# Patient Record
Sex: FEMALE | Race: WHITE | Employment: OTHER | URBAN - METROPOLITAN AREA
[De-identification: names, ages, dates, MRNs, and addresses within clinical notes are randomized per-mention and may not be internally consistent; named-entity substitution may affect disease eponyms.]

---

## 2017-07-12 PROBLEM — R42 VERTIGO: Status: ACTIVE | Noted: 2017-07-12

## 2017-07-12 PROBLEM — R53.82 CHRONIC FATIGUE: Status: ACTIVE | Noted: 2017-07-12

## 2017-12-06 PROBLEM — I49.5 SSS (SICK SINUS SYNDROME) (HCC): Status: ACTIVE | Noted: 2017-12-06

## 2017-12-19 PROBLEM — K58.2 IRRITABLE BOWEL SYNDROME WITH BOTH CONSTIPATION AND DIARRHEA: Status: ACTIVE | Noted: 2017-12-19

## 2018-01-12 PROBLEM — F41.9 ANXIETY: Status: ACTIVE | Noted: 2018-01-12

## 2019-01-01 ENCOUNTER — PATIENT OUTREACH (OUTPATIENT)
Dept: CASE MANAGEMENT | Age: 84
End: 2019-01-01

## 2019-01-01 ENCOUNTER — APPOINTMENT (OUTPATIENT)
Dept: GENERAL RADIOLOGY | Age: 84
End: 2019-01-01
Attending: EMERGENCY MEDICINE
Payer: MEDICARE

## 2019-01-01 ENCOUNTER — HOSPITAL ENCOUNTER (EMERGENCY)
Age: 84
Discharge: HOME | End: 2019-09-05
Attending: EMERGENCY MEDICINE
Payer: MEDICARE

## 2019-01-01 ENCOUNTER — APPOINTMENT (OUTPATIENT)
Dept: CT IMAGING | Age: 84
End: 2019-01-01
Attending: EMERGENCY MEDICINE
Payer: MEDICARE

## 2019-01-01 VITALS
HEART RATE: 60 BPM | HEIGHT: 65 IN | SYSTOLIC BLOOD PRESSURE: 144 MMHG | RESPIRATION RATE: 20 BRPM | TEMPERATURE: 98.7 F | WEIGHT: 172 LBS | DIASTOLIC BLOOD PRESSURE: 63 MMHG | BODY MASS INDEX: 28.66 KG/M2 | OXYGEN SATURATION: 91 %

## 2019-01-01 DIAGNOSIS — K29.90 GASTRITIS AND DUODENITIS: Primary | ICD-10-CM

## 2019-01-01 LAB
ALBUMIN SERPL-MCNC: 3.6 G/DL (ref 3.2–4.6)
ALBUMIN/GLOB SERPL: 0.8 {RATIO} (ref 1.2–3.5)
ALP SERPL-CCNC: 132 U/L (ref 50–136)
ALT SERPL-CCNC: 14 U/L (ref 12–65)
ANION GAP SERPL CALC-SCNC: 5 MMOL/L (ref 7–16)
APPEARANCE UR: CLEAR
AST SERPL-CCNC: 20 U/L (ref 15–37)
ATRIAL RATE: 133 BPM
BASOPHILS # BLD: 0.1 K/UL (ref 0–0.2)
BASOPHILS NFR BLD: 2 % (ref 0–2)
BILIRUB SERPL-MCNC: 0.6 MG/DL (ref 0.2–1.1)
BILIRUB UR QL: NEGATIVE
BNP SERPL-MCNC: 170 PG/ML
BUN SERPL-MCNC: 13 MG/DL (ref 8–23)
CALCIUM SERPL-MCNC: 9.3 MG/DL (ref 8.3–10.4)
CALCULATED R AXIS, ECG10: -88 DEGREES
CALCULATED T AXIS, ECG11: 96 DEGREES
CHLORIDE SERPL-SCNC: 106 MMOL/L (ref 98–107)
CO2 SERPL-SCNC: 29 MMOL/L (ref 21–32)
COLOR UR: YELLOW
CREAT SERPL-MCNC: 1.36 MG/DL (ref 0.6–1)
DIAGNOSIS, 93000: NORMAL
DIFFERENTIAL METHOD BLD: ABNORMAL
EOSINOPHIL # BLD: 0.3 K/UL (ref 0–0.8)
EOSINOPHIL NFR BLD: 6 % (ref 0.5–7.8)
ERYTHROCYTE [DISTWIDTH] IN BLOOD BY AUTOMATED COUNT: 15.8 % (ref 11.9–14.6)
GLOBULIN SER CALC-MCNC: 4.7 G/DL (ref 2.3–3.5)
GLUCOSE SERPL-MCNC: 93 MG/DL (ref 65–100)
GLUCOSE UR STRIP.AUTO-MCNC: NEGATIVE MG/DL
HCT VFR BLD AUTO: 33.9 % (ref 35.8–46.3)
HGB BLD-MCNC: 10.5 G/DL (ref 11.7–15.4)
HGB UR QL STRIP: NEGATIVE
IMM GRANULOCYTES # BLD AUTO: 0 K/UL (ref 0–0.5)
IMM GRANULOCYTES NFR BLD AUTO: 0 % (ref 0–5)
KETONES UR QL STRIP.AUTO: NEGATIVE MG/DL
LACTATE BLD-SCNC: 0.82 MMOL/L (ref 0.5–1.9)
LEUKOCYTE ESTERASE UR QL STRIP.AUTO: NEGATIVE
LIPASE SERPL-CCNC: 88 U/L (ref 73–393)
LYMPHOCYTES # BLD: 0.8 K/UL (ref 0.5–4.6)
LYMPHOCYTES NFR BLD: 16 % (ref 13–44)
MCH RBC QN AUTO: 30.8 PG (ref 26.1–32.9)
MCHC RBC AUTO-ENTMCNC: 31 G/DL (ref 31.4–35)
MCV RBC AUTO: 99.4 FL (ref 79.6–97.8)
MONOCYTES # BLD: 0.5 K/UL (ref 0.1–1.3)
MONOCYTES NFR BLD: 10 % (ref 4–12)
NEUTS SEG # BLD: 3.4 K/UL (ref 1.7–8.2)
NEUTS SEG NFR BLD: 65 % (ref 43–78)
NITRITE UR QL STRIP.AUTO: NEGATIVE
NRBC # BLD: 0 K/UL (ref 0–0.2)
PH UR STRIP: 8 [PH] (ref 5–9)
PLATELET # BLD AUTO: 220 K/UL (ref 150–450)
PMV BLD AUTO: 10.6 FL (ref 9.4–12.3)
POTASSIUM SERPL-SCNC: 4.3 MMOL/L (ref 3.5–5.1)
PROT SERPL-MCNC: 8.3 G/DL (ref 6.3–8.2)
PROT UR STRIP-MCNC: NEGATIVE MG/DL
Q-T INTERVAL, ECG07: 464 MS
QRS DURATION, ECG06: 172 MS
QTC CALCULATION (BEZET), ECG08: 464 MS
RBC # BLD AUTO: 3.41 M/UL (ref 4.05–5.2)
SODIUM SERPL-SCNC: 140 MMOL/L (ref 136–145)
SP GR UR REFRACTOMETRY: 1.01 (ref 1–1.02)
UROBILINOGEN UR QL STRIP.AUTO: 0.2 EU/DL (ref 0.2–1)
VENTRICULAR RATE, ECG03: 60 BPM
WBC # BLD AUTO: 5.1 K/UL (ref 4.3–11.1)

## 2019-01-01 PROCEDURE — 81003 URINALYSIS AUTO W/O SCOPE: CPT

## 2019-01-01 PROCEDURE — 96361 HYDRATE IV INFUSION ADD-ON: CPT | Performed by: EMERGENCY MEDICINE

## 2019-01-01 PROCEDURE — 85025 COMPLETE CBC W/AUTO DIFF WBC: CPT

## 2019-01-01 PROCEDURE — 80053 COMPREHEN METABOLIC PANEL: CPT

## 2019-01-01 PROCEDURE — 99285 EMERGENCY DEPT VISIT HI MDM: CPT | Performed by: EMERGENCY MEDICINE

## 2019-01-01 PROCEDURE — 74011250636 HC RX REV CODE- 250/636: Performed by: EMERGENCY MEDICINE

## 2019-01-01 PROCEDURE — 70450 CT HEAD/BRAIN W/O DYE: CPT

## 2019-01-01 PROCEDURE — 96360 HYDRATION IV INFUSION INIT: CPT | Performed by: EMERGENCY MEDICINE

## 2019-01-01 PROCEDURE — 71045 X-RAY EXAM CHEST 1 VIEW: CPT

## 2019-01-01 PROCEDURE — 83690 ASSAY OF LIPASE: CPT

## 2019-01-01 PROCEDURE — 83605 ASSAY OF LACTIC ACID: CPT

## 2019-01-01 PROCEDURE — 83880 ASSAY OF NATRIURETIC PEPTIDE: CPT

## 2019-01-01 PROCEDURE — 93005 ELECTROCARDIOGRAM TRACING: CPT | Performed by: EMERGENCY MEDICINE

## 2019-01-01 RX ORDER — FAMOTIDINE 40 MG/1
40 TABLET, FILM COATED ORAL
Qty: 20 TAB | Refills: 0 | Status: SHIPPED | OUTPATIENT
Start: 2019-01-01 | End: 2019-01-01 | Stop reason: ALTCHOICE

## 2019-01-01 RX ADMIN — SODIUM CHLORIDE 1000 ML: 900 INJECTION, SOLUTION INTRAVENOUS at 14:03

## 2019-03-07 PROBLEM — I25.10 CAD (CORONARY ARTERY DISEASE): Status: ACTIVE | Noted: 2019-03-07

## 2019-06-13 ENCOUNTER — ANESTHESIA EVENT (OUTPATIENT)
Dept: ENDOSCOPY | Age: 84
End: 2019-06-13
Payer: MEDICARE

## 2019-06-13 RX ORDER — SODIUM CHLORIDE 0.9 % (FLUSH) 0.9 %
5-40 SYRINGE (ML) INJECTION EVERY 8 HOURS
Status: CANCELLED | OUTPATIENT
Start: 2019-06-13

## 2019-06-13 RX ORDER — SODIUM CHLORIDE 0.9 % (FLUSH) 0.9 %
5-40 SYRINGE (ML) INJECTION AS NEEDED
Status: CANCELLED | OUTPATIENT
Start: 2019-06-13

## 2019-06-13 RX ORDER — SODIUM CHLORIDE, SODIUM LACTATE, POTASSIUM CHLORIDE, CALCIUM CHLORIDE 600; 310; 30; 20 MG/100ML; MG/100ML; MG/100ML; MG/100ML
100 INJECTION, SOLUTION INTRAVENOUS CONTINUOUS
Status: CANCELLED | OUTPATIENT
Start: 2019-06-13

## 2019-06-14 ENCOUNTER — HOSPITAL ENCOUNTER (OUTPATIENT)
Age: 84
Setting detail: OUTPATIENT SURGERY
Discharge: HOME | End: 2019-06-14
Attending: INTERNAL MEDICINE | Admitting: INTERNAL MEDICINE
Payer: MEDICARE

## 2019-06-14 ENCOUNTER — ANESTHESIA (OUTPATIENT)
Dept: ENDOSCOPY | Age: 84
End: 2019-06-14
Payer: MEDICARE

## 2019-06-14 VITALS
RESPIRATION RATE: 18 BRPM | TEMPERATURE: 97.7 F | OXYGEN SATURATION: 98 % | SYSTOLIC BLOOD PRESSURE: 143 MMHG | HEIGHT: 62 IN | BODY MASS INDEX: 32.39 KG/M2 | WEIGHT: 176 LBS | HEART RATE: 68 BPM | DIASTOLIC BLOOD PRESSURE: 66 MMHG

## 2019-06-14 PROCEDURE — 76040000025: Performed by: INTERNAL MEDICINE

## 2019-06-14 PROCEDURE — 74011250636 HC RX REV CODE- 250/636

## 2019-06-14 PROCEDURE — 76060000031 HC ANESTHESIA FIRST 0.5 HR: Performed by: INTERNAL MEDICINE

## 2019-06-14 PROCEDURE — 77030021593 HC FCPS BIOP ENDOSC BSC -A: Performed by: INTERNAL MEDICINE

## 2019-06-14 PROCEDURE — 74011250636 HC RX REV CODE- 250/636: Performed by: ANESTHESIOLOGY

## 2019-06-14 RX ORDER — SODIUM CHLORIDE, SODIUM LACTATE, POTASSIUM CHLORIDE, CALCIUM CHLORIDE 600; 310; 30; 20 MG/100ML; MG/100ML; MG/100ML; MG/100ML
100 INJECTION, SOLUTION INTRAVENOUS CONTINUOUS
Status: DISCONTINUED | OUTPATIENT
Start: 2019-06-14 | End: 2019-06-14 | Stop reason: HOSPADM

## 2019-06-14 RX ORDER — SODIUM CHLORIDE 9 MG/ML
10 INJECTION, SOLUTION INTRAVENOUS CONTINUOUS
Status: DISCONTINUED | OUTPATIENT
Start: 2019-06-14 | End: 2019-06-14 | Stop reason: HOSPADM

## 2019-06-14 RX ORDER — PROPOFOL 10 MG/ML
INJECTION, EMULSION INTRAVENOUS AS NEEDED
Status: DISCONTINUED | OUTPATIENT
Start: 2019-06-14 | End: 2019-06-14 | Stop reason: HOSPADM

## 2019-06-14 RX ORDER — LIDOCAINE HYDROCHLORIDE 20 MG/ML
INJECTION, SOLUTION EPIDURAL; INFILTRATION; INTRACAUDAL; PERINEURAL AS NEEDED
Status: DISCONTINUED | OUTPATIENT
Start: 2019-06-14 | End: 2019-06-14 | Stop reason: HOSPADM

## 2019-06-14 RX ADMIN — PROPOFOL 30 MG: 10 INJECTION, EMULSION INTRAVENOUS at 11:26

## 2019-06-14 RX ADMIN — FAMOTIDINE 20 MG: 10 INJECTION, SOLUTION INTRAVENOUS at 11:17

## 2019-06-14 RX ADMIN — PROPOFOL 40 MG: 10 INJECTION, EMULSION INTRAVENOUS at 11:24

## 2019-06-14 RX ADMIN — LIDOCAINE HYDROCHLORIDE 50 MG: 20 INJECTION, SOLUTION EPIDURAL; INFILTRATION; INTRACAUDAL; PERINEURAL at 11:24

## 2019-06-14 RX ADMIN — PROPOFOL 20 MG: 10 INJECTION, EMULSION INTRAVENOUS at 11:28

## 2019-06-14 RX ADMIN — SODIUM CHLORIDE, SODIUM LACTATE, POTASSIUM CHLORIDE, AND CALCIUM CHLORIDE 100 ML/HR: 600; 310; 30; 20 INJECTION, SOLUTION INTRAVENOUS at 11:17

## 2019-06-14 RX ADMIN — PROPOFOL 10 MG: 10 INJECTION, EMULSION INTRAVENOUS at 11:30

## 2019-06-14 NOTE — DISCHARGE INSTRUCTIONS
Gastrointestinal Esophagogastroduodenoscopy (EGD) - Upper Exam Discharge Instructions    1. Call Dr. Shlomo Najera at [phone] for any problems or questions. 2. Contact the doctor's office for follow up appointment as directed. 3. Medication may cause drowsiness for several hours, therefore, do not drive or operate machinery for remainder of the day. Do not make any legal decisions today. 4. No alcohol today. 5. Ordinarily, you may resume regular diet and activity after exam unless otherwise specified by your physician. 6. For mild soreness in your throat you may use Cepacol throat lozenges or warm salt-water gargles as needed. Any additional instructions:   1. Follow-up in office  2.  Repeat dilation as needed

## 2019-06-14 NOTE — ANESTHESIA PREPROCEDURE EVALUATION
Relevant Problems No relevant active problems Anesthetic History No history of anesthetic complications Review of Systems / Medical History Patient summary reviewed and pertinent labs reviewed Pulmonary Within defined limits Neuro/Psych Within defined limits Cardiovascular Hypertension: well controlled Valvular problems/murmurs (s/p TVR at 2008, mod MR/AI; nl ER) Dysrhythmias : atrial fibrillation CAD and hyperlipidemia Pertinent negatives: Pacemaker: st vinicio pacer. Exercise tolerance: <4 METS Comments: On xarelto GI/Hepatic/Renal 
  
GERD: well controlled Endo/Other Arthritis Other Findings Comments: Dysphagia PMR 
LBP Neuropathy Gout 
presbycusis Physical Exam 
 
Airway Mallampati: II 
TM Distance: 4 - 6 cm Neck ROM: normal range of motion Mouth opening: Normal 
 
 Cardiovascular Rhythm: regular Rate: normal 
 
 
 
 Dental 
 
Dentition: Full lower dentures and Full upper dentures Pulmonary Breath sounds clear to auscultation Abdominal 
GI exam deferred Other Findings Anesthetic Plan ASA: 3 Anesthesia type: total IV anesthesia Induction: Intravenous Anesthetic plan and risks discussed with: Patient

## 2019-06-14 NOTE — H&P
History and Physical for Outpatient Procedure Date: 6/14/2019 History of Present Illness:  Patient has history of dysphagia and presents for EGD with possible esophageal dilation. Past Medical History:  
Diagnosis Date  Abnormal coagulation profile  Anxiety 1/12/2018  Arthritis  Atrial fibrillation ([facility] [address] 11/15/2012  
 and SSS and has St vinicio pacer  Benign neoplasm of colon  CAD (coronary artery disease)   
 valve replacement in 2006 (patient unsure which valve and not noted in Dr Alan Florez note  Depressive disorder, not elsewhere classified  Edema  Encounter for long-term (current) use of other medications  Esophageal reflux  Essential hypertension, benign  GERD (gastroesophageal reflux disease)   
 controlled with omperazole  Gout, unspecified   
 patient and family denies  Hearing loss  Hypertension  Insomnia, unspecified  Loss of height  Lumbago  Nervousness(799.21)  Osteoarthrosis involving, or with mention of more than one site, but not specified as generalized, multiple sites  Osteopenia  Other and unspecified hyperlipidemia  Other dyspnea and respiratory abnormality  Other malaise and fatigue  Pain in limb  Polymyalgia rheumatica ([facility] [address]  Premature menopause 4/10/2015  Renal insufficiency   
 patient and family denies, last creatine was 0.9 12/2015  Unspecified deficiency anemia  Unspecified fall  Unspecified hereditary and idiopathic peripheral neuropathy  Urinary frequency  Urinary tract infection, site not specified Past Surgical History:  
Procedure Laterality Date  HX APPENDECTOMY  age 25  
 HX CHOLECYSTECTOMY  2002 [address] VALVE SURGERY  2006 Replacement, family and patient not sure which valve, note noted in cardiology note  HX HYSTERECTOMY  HX KNEE REPLACEMENT Left 12/15/2011  HX KNEE REPLACEMENT Right 10/05/2005 Dr Dangelo Agosto  HX LUMBAR LAMINECTOMY  HX LUMBAR LAMINECTOMY  HX PACEMAKER  11/15/12 Dual chamber, non rate responsive (implantable) st vinicio In and Family History Problem Relation Age of Onset  Pacemaker Mother  Heart Disease Mother  Cancer Father  Pacemaker Brother  Heart Disease Brother  Diabetes Sister  Diabetes Sister Social History Tobacco Use  Smoking status: Never Smoker  Smokeless tobacco: Never Used Substance Use Topics  Alcohol use: No  
  Alcohol/week: 0.0 oz Allergies Allergen Reactions  Prednisolone Not Reported This Time S/s: heart racing per family member  Prednisone Unknown (comments)  Protuss [Hydrocodone-Potassium Guaiaco] Other (comments) Sleepless, jittery No current facility-administered medications for this encounter. Current Outpatient Medications Medication Sig  
 amLODIPine (NORVASC) 2.5 mg tablet Take 1 Tab by mouth daily.  [START ON 8/5/2019] HYDROcodone-acetaminophen (NORCO) 5-325 mg per tablet Take 1 Tab by mouth every eight (8) hours as needed for Pain for up to 30 days. Max Daily Amount: 3 Tabs.  furosemide (LASIX) 20 mg tablet Take 1 tablet by mouth daily.  citalopram (CELEXA) 40 mg tablet Take 1 tablet by mouth daily.  nystatin (MYCOSTATIN) topical cream Apply 1 g to affected area two (2) times a day.  pantoprazole (PROTONIX) 40 mg tablet Take 1 Tab by mouth daily.  buPROPion XL (WELLBUTRIN XL) 150 mg tablet Take 1 Tab by mouth every morning.  spironolactone (ALDACTONE) 25 mg tablet Take 1 Tab by mouth daily.  LORazepam (ATIVAN) 1 mg tablet TAKE ONE TABLET BY MOUTH TWICE DAILY AS NEEDED FOR ANXIETY  diclofenac (VOLTAREN) 1 % gel Apply  to affected area four (4) times daily.  olmesartan (BENICAR) 20 mg tablet Take 1 Tab by mouth daily.  rivaroxaban (XARELTO) 20 mg tab tablet Take 1 Tab by mouth daily.  TOCILIZUMAB (ACTEMRA IV) by IntraVENous route every thirty (30) days. Patient has had to put on hold per Rheumatologist until after follow up appt with Dr Danny Poon  nitroglycerin (NITROSTAT) 0.4 mg SL tablet by SubLINGual route every five (5) minutes as needed for Chest Pain.  FOLIC ACID/MV,FE,OTHER MIN (CENTRUM PO) Take  by mouth daily. Review of Systems: A detailed 10 organ review of systems is obtained with pertinent positives as listed in the History of Present Illness. All others are negative. Objective:  
 
Physical Exam: 
There were no vitals taken for this visit. General:  Alert and oriented. Heart: Regular rate and rhythm Lungs:  Clear to auscultation bilaterally Abdomen: Soft, nontender, nondistended Impression/Plan:  
 
Proceed with EGD with possible dilation as planned. I have discussed with the patient the technique, benefits, alternatives, and risks of these procedures, including medication reaction, immediate or delayed bleeding, or perforation of the gastrointestinal tract. Signed By: Yeny Sexton MD   
 June 14, 2019

## 2019-06-14 NOTE — ADDENDUM NOTE
Addendum  created 06/14/19 1150 by Wendi Yadav MD  
 Attestation recorded in [address] [place] [address], [facility] 97 filed

## 2019-06-14 NOTE — PROCEDURES
Esophagogastroduodenoscopy DATE of PROCEDURE: 6/14/2019 MEDICATIONS ADMINISTERED: MAC INSTRUMENT: GIF 
 
PROCEDURE:  After obtaining informed consent, the patient was placed in the left lateral position and sedated. The endoscope was advanced under direct vision without difficulty. The esophagus, stomach (including retroflexed views) and duodenum were evaluated. The patient was taken to the recovery area in stable condition. FINDINGS: 
 
ESOPHAGUS: Schatzki ring at the GEJ. Some mild tapering prior to this. Dilation performed with [address] dilator. The ring was also fractured with biopsy forceps. Post dilation inspection revealed no deep mucosal injury. STOMACH: Unremarkable on forward and retroflexed views. DUODENUM: Unremarkable Estimated blood loss: 0-minimal  
 
PLAN: 
1. Follow up in office with Dr Randy George 2. Can repeat dilation if needed.  
 
Mariana Kelsey MD 
[facility], [place]

## 2019-06-14 NOTE — ROUTINE PROCESS
VSS. Discharge instructions reviewed with patient and daughter and copy of instructions sent home with patient. Dr. Hannah Castle spoke with patient and daughter prior to discharge. Questions answered. Discharged via personal vehicle, wheeled out by [place]. IV discontinued prior to discharge. Personal items with patient at discharge: all clothing shoes belongings

## 2019-06-14 NOTE — ANESTHESIA POSTPROCEDURE EVALUATION
Procedure(s): ESOPHAGOGASTRODUODENOSCOPY (EGD) WITH DILATION /BMI 31 ESOPHAGEAL DILATION. total IV anesthesia Anesthesia Post Evaluation Multimodal analgesia: multimodal analgesia used between 6 hours prior to anesthesia start to PACU discharge Patient location during evaluation: bedside Patient participation: complete - patient participated Level of consciousness: awake Pain management: adequate Airway patency: patent Anesthetic complications: no 
Cardiovascular status: acceptable and stable Respiratory status: acceptable and room air Hydration status: acceptable Post anesthesia nausea and vomiting:  none Vitals Value Taken Time /59 6/14/2019 11:47 AM  
Temp 36.5 °C (97.7 °F) 6/14/2019 11:38 AM  
Pulse 60 6/14/2019 11:48 AM  
Resp 16 6/14/2019 11:38 AM  
SpO2 97 % 6/14/2019 11:48 AM  
Vitals shown include unvalidated device data.

## 2019-06-24 ENCOUNTER — HOME HEALTH ADMISSION (OUTPATIENT)
Dept: HOME HEALTH SERVICES | Facility: HOME HEALTH | Age: 84
End: 2019-06-24

## 2019-06-25 ENCOUNTER — HOSPITAL ENCOUNTER (INPATIENT)
Age: 84
LOS: 2 days | Discharge: HOME | DRG: 683 | End: 2019-06-27
Attending: EMERGENCY MEDICINE | Admitting: FAMILY MEDICINE
Payer: MEDICARE

## 2019-06-25 DIAGNOSIS — E87.5 ACUTE HYPERKALEMIA: Primary | ICD-10-CM

## 2019-06-25 DIAGNOSIS — N18.9 ACUTE ON CHRONIC RENAL INSUFFICIENCY: ICD-10-CM

## 2019-06-25 DIAGNOSIS — I50.9 CHRONIC CONGESTIVE HEART FAILURE, UNSPECIFIED HEART FAILURE TYPE (HCC): ICD-10-CM

## 2019-06-25 DIAGNOSIS — N28.9 ACUTE ON CHRONIC RENAL INSUFFICIENCY: ICD-10-CM

## 2019-06-25 PROBLEM — N17.9 ACUTE KIDNEY INJURY SUPERIMPOSED ON CKD (HCC): Status: ACTIVE | Noted: 2019-06-25

## 2019-06-25 PROBLEM — G89.29 CHRONIC PAIN: Status: ACTIVE | Noted: 2019-06-25

## 2019-06-25 LAB
ALBUMIN SERPL-MCNC: 3.8 G/DL (ref 3.2–4.6)
ALBUMIN/GLOB SERPL: 1 {RATIO} (ref 1.2–3.5)
ALP SERPL-CCNC: 93 U/L (ref 50–136)
ALT SERPL-CCNC: 19 U/L (ref 12–65)
ANION GAP SERPL CALC-SCNC: 6 MMOL/L (ref 7–16)
ARTERIAL PATENCY WRIST A: ABNORMAL
AST SERPL-CCNC: 19 U/L (ref 15–37)
ATRIAL RATE: 60 BPM
BASOPHILS # BLD: 0.1 K/UL (ref 0–0.2)
BASOPHILS NFR BLD: 1 % (ref 0–2)
BDY SITE: ABNORMAL
BILIRUB SERPL-MCNC: 0.7 MG/DL (ref 0.2–1.1)
BNP SERPL-MCNC: 119 PG/ML
BODY TEMPERATURE: 98.6
BUN SERPL-MCNC: 55 MG/DL (ref 8–23)
CA-I BLD-MCNC: 1.5 MMOL/L (ref 1.12–1.32)
CALCIUM SERPL-MCNC: 9.6 MG/DL (ref 8.3–10.4)
CALCULATED R AXIS, ECG10: -98 DEGREES
CALCULATED T AXIS, ECG11: 120 DEGREES
CHLORIDE SERPL-SCNC: 109 MMOL/L (ref 98–107)
CO2 SERPL-SCNC: 23 MMOL/L (ref 21–32)
COLLECT TIME,HTIME: 1425
CREAT SERPL-MCNC: 2.68 MG/DL (ref 0.6–1)
DIAGNOSIS, 93000: NORMAL
DIFFERENTIAL METHOD BLD: ABNORMAL
EOSINOPHIL # BLD: 0.2 K/UL (ref 0–0.8)
EOSINOPHIL NFR BLD: 4 % (ref 0.5–7.8)
ERYTHROCYTE [DISTWIDTH] IN BLOOD BY AUTOMATED COUNT: 12.1 % (ref 11.9–14.6)
GAS FLOW.O2 O2 DELIVERY SYS: ABNORMAL L/MIN
GLOBULIN SER CALC-MCNC: 3.9 G/DL (ref 2.3–3.5)
GLUCOSE BLD STRIP.AUTO-MCNC: 87 MG/DL (ref 65–100)
GLUCOSE SERPL-MCNC: 103 MG/DL (ref 65–100)
HCO3 BLDV-SCNC: 23.5 MMOL/L (ref 23–28)
HCT VFR BLD AUTO: 33.7 % (ref 35.8–46.3)
HGB BLD-MCNC: 10.8 G/DL (ref 11.7–15.4)
IMM GRANULOCYTES # BLD AUTO: 0 K/UL (ref 0–0.5)
IMM GRANULOCYTES NFR BLD AUTO: 1 % (ref 0–5)
LYMPHOCYTES # BLD: 1.1 K/UL (ref 0.5–4.6)
LYMPHOCYTES NFR BLD: 25 % (ref 13–44)
MCH RBC QN AUTO: 30.5 PG (ref 26.1–32.9)
MCHC RBC AUTO-ENTMCNC: 32 G/DL (ref 31.4–35)
MCV RBC AUTO: 95.2 FL (ref 79.6–97.8)
MONOCYTES # BLD: 0.4 K/UL (ref 0.1–1.3)
MONOCYTES NFR BLD: 8 % (ref 4–12)
NEUTS SEG # BLD: 2.8 K/UL (ref 1.7–8.2)
NEUTS SEG NFR BLD: 61 % (ref 43–78)
NRBC # BLD: 0 K/UL (ref 0–0.2)
PCO2 BLDV: 44.5 MMHG (ref 41–51)
PH BLDV: 7.33 [PH] (ref 7.32–7.42)
PLATELET # BLD AUTO: 161 K/UL (ref 150–450)
PMV BLD AUTO: 10.9 FL (ref 9.4–12.3)
PO2 BLDV: 17 MMHG
POTASSIUM BLD-SCNC: 6.2 MMOL/L (ref 3.5–5.5)
POTASSIUM SERPL-SCNC: 6 MMOL/L (ref 3.5–5.1)
PROT SERPL-MCNC: 7.7 G/DL (ref 6.3–8.2)
Q-T INTERVAL, ECG07: 430 MS
QRS DURATION, ECG06: 162 MS
QTC CALCULATION (BEZET), ECG08: 440 MS
RBC # BLD AUTO: 3.54 M/UL (ref 4.05–5.2)
SAO2 % BLDV: 21 % (ref 65–88)
SERVICE CMNT-IMP: ABNORMAL
SERVICE CMNT-IMP: ABNORMAL
SODIUM BLD-SCNC: 139 MMOL/L (ref 135–145)
SODIUM SERPL-SCNC: 138 MMOL/L (ref 136–145)
SPECIMEN TYPE: ABNORMAL
VENTRICULAR RATE, ECG03: 63 BPM
WBC # BLD AUTO: 4.6 K/UL (ref 4.3–11.1)

## 2019-06-25 PROCEDURE — 96375 TX/PRO/DX INJ NEW DRUG ADDON: CPT | Performed by: EMERGENCY MEDICINE

## 2019-06-25 PROCEDURE — 99285 EMERGENCY DEPT VISIT HI MDM: CPT | Performed by: EMERGENCY MEDICINE

## 2019-06-25 PROCEDURE — 83880 ASSAY OF NATRIURETIC PEPTIDE: CPT

## 2019-06-25 PROCEDURE — 74011000250 HC RX REV CODE- 250: Performed by: EMERGENCY MEDICINE

## 2019-06-25 PROCEDURE — 74011250636 HC RX REV CODE- 250/636: Performed by: EMERGENCY MEDICINE

## 2019-06-25 PROCEDURE — 74011250637 HC RX REV CODE- 250/637: Performed by: FAMILY MEDICINE

## 2019-06-25 PROCEDURE — 65660000000 HC RM CCU STEPDOWN

## 2019-06-25 PROCEDURE — 74011000258 HC RX REV CODE- 258: Performed by: EMERGENCY MEDICINE

## 2019-06-25 PROCEDURE — 85025 COMPLETE CBC W/AUTO DIFF WBC: CPT

## 2019-06-25 PROCEDURE — 74011250637 HC RX REV CODE- 250/637: Performed by: INTERNAL MEDICINE

## 2019-06-25 PROCEDURE — 74011250637 HC RX REV CODE- 250/637: Performed by: EMERGENCY MEDICINE

## 2019-06-25 PROCEDURE — 93005 ELECTROCARDIOGRAM TRACING: CPT | Performed by: EMERGENCY MEDICINE

## 2019-06-25 PROCEDURE — 82803 BLOOD GASES ANY COMBINATION: CPT

## 2019-06-25 PROCEDURE — 82947 ASSAY GLUCOSE BLOOD QUANT: CPT

## 2019-06-25 PROCEDURE — 86580 TB INTRADERMAL TEST: CPT | Performed by: FAMILY MEDICINE

## 2019-06-25 PROCEDURE — 96365 THER/PROPH/DIAG IV INF INIT: CPT | Performed by: EMERGENCY MEDICINE

## 2019-06-25 PROCEDURE — 94640 AIRWAY INHALATION TREATMENT: CPT

## 2019-06-25 PROCEDURE — 74011250636 HC RX REV CODE- 250/636: Performed by: FAMILY MEDICINE

## 2019-06-25 PROCEDURE — 74011000302 HC RX REV CODE- 302: Performed by: FAMILY MEDICINE

## 2019-06-25 PROCEDURE — 80053 COMPREHEN METABOLIC PANEL: CPT

## 2019-06-25 RX ORDER — ONDANSETRON 2 MG/ML
4 INJECTION INTRAMUSCULAR; INTRAVENOUS
Status: DISCONTINUED | OUTPATIENT
Start: 2019-06-25 | End: 2019-06-27 | Stop reason: HOSPADM

## 2019-06-25 RX ORDER — SODIUM CHLORIDE 0.9 % (FLUSH) 0.9 %
5-40 SYRINGE (ML) INJECTION AS NEEDED
Status: DISCONTINUED | OUTPATIENT
Start: 2019-06-25 | End: 2019-06-27 | Stop reason: HOSPADM

## 2019-06-25 RX ORDER — HYDROCODONE BITARTRATE AND ACETAMINOPHEN 5; 325 MG/1; MG/1
1 TABLET ORAL
Status: DISCONTINUED | OUTPATIENT
Start: 2019-06-25 | End: 2019-06-27 | Stop reason: HOSPADM

## 2019-06-25 RX ORDER — SODIUM CHLORIDE 0.9 % (FLUSH) 0.9 %
5-40 SYRINGE (ML) INJECTION EVERY 8 HOURS
Status: DISCONTINUED | OUTPATIENT
Start: 2019-06-25 | End: 2019-06-27 | Stop reason: HOSPADM

## 2019-06-25 RX ORDER — SODIUM POLYSTYRENE SULFONATE 4.1 MEQ/G
30 POWDER, FOR SUSPENSION ORAL; RECTAL
Status: COMPLETED | OUTPATIENT
Start: 2019-06-25 | End: 2019-06-25

## 2019-06-25 RX ORDER — NITROGLYCERIN 0.4 MG/1
0.4 TABLET SUBLINGUAL
Status: DISCONTINUED | OUTPATIENT
Start: 2019-06-25 | End: 2019-06-27 | Stop reason: HOSPADM

## 2019-06-25 RX ORDER — AMLODIPINE BESYLATE 5 MG/1
2.5 TABLET ORAL DAILY
Status: DISCONTINUED | OUTPATIENT
Start: 2019-06-26 | End: 2019-06-27 | Stop reason: HOSPADM

## 2019-06-25 RX ORDER — SODIUM CHLORIDE 9 MG/ML
75 INJECTION, SOLUTION INTRAVENOUS CONTINUOUS
Status: DISCONTINUED | OUTPATIENT
Start: 2019-06-25 | End: 2019-06-27

## 2019-06-25 RX ORDER — MORPHINE SULFATE 2 MG/ML
1 INJECTION, SOLUTION INTRAMUSCULAR; INTRAVENOUS
Status: DISCONTINUED | OUTPATIENT
Start: 2019-06-25 | End: 2019-06-27 | Stop reason: HOSPADM

## 2019-06-25 RX ORDER — SODIUM BICARBONATE 84 MG/ML
50 INJECTION, SOLUTION INTRAVENOUS
Status: COMPLETED | OUTPATIENT
Start: 2019-06-25 | End: 2019-06-25

## 2019-06-25 RX ORDER — HYDRALAZINE HYDROCHLORIDE 20 MG/ML
10 INJECTION INTRAMUSCULAR; INTRAVENOUS
Status: DISCONTINUED | OUTPATIENT
Start: 2019-06-25 | End: 2019-06-27 | Stop reason: HOSPADM

## 2019-06-25 RX ORDER — LORAZEPAM 1 MG/1
1 TABLET ORAL
Status: DISCONTINUED | OUTPATIENT
Start: 2019-06-25 | End: 2019-06-27 | Stop reason: HOSPADM

## 2019-06-25 RX ORDER — CALCIUM CHLORIDE INJECTION 100 MG/ML
1 INJECTION, SOLUTION INTRAVENOUS
Status: DISCONTINUED | OUTPATIENT
Start: 2019-06-25 | End: 2019-06-25 | Stop reason: ALTCHOICE

## 2019-06-25 RX ORDER — ALBUTEROL SULFATE 0.83 MG/ML
2.5 SOLUTION RESPIRATORY (INHALATION)
Status: COMPLETED | OUTPATIENT
Start: 2019-06-25 | End: 2019-06-25

## 2019-06-25 RX ORDER — BUPROPION HYDROCHLORIDE 150 MG/1
150 TABLET, EXTENDED RELEASE ORAL DAILY
Status: DISCONTINUED | OUTPATIENT
Start: 2019-06-26 | End: 2019-06-27 | Stop reason: HOSPADM

## 2019-06-25 RX ORDER — ACETAMINOPHEN 325 MG/1
650 TABLET ORAL
Status: DISCONTINUED | OUTPATIENT
Start: 2019-06-25 | End: 2019-06-27 | Stop reason: HOSPADM

## 2019-06-25 RX ORDER — CITALOPRAM 20 MG/1
40 TABLET, FILM COATED ORAL DAILY
Status: DISCONTINUED | OUTPATIENT
Start: 2019-06-26 | End: 2019-06-27 | Stop reason: HOSPADM

## 2019-06-25 RX ADMIN — Medication 10 ML: at 22:20

## 2019-06-25 RX ADMIN — ALBUTEROL SULFATE 2.5 MG: 2.5 SOLUTION RESPIRATORY (INHALATION) at 11:58

## 2019-06-25 RX ADMIN — TUBERCULIN PURIFIED PROTEIN DERIVATIVE 5 UNITS: 5 INJECTION, SOLUTION INTRADERMAL at 16:42

## 2019-06-25 RX ADMIN — SODIUM POLYSTYRENE SULFONATE 30 G: 4.1 POWDER, FOR SUSPENSION ORAL; RECTAL at 11:46

## 2019-06-25 RX ADMIN — ALBUTEROL SULFATE 2.5 MG: 2.5 SOLUTION RESPIRATORY (INHALATION) at 14:29

## 2019-06-25 RX ADMIN — ACETAMINOPHEN 650 MG: 325 TABLET, FILM COATED ORAL at 21:20

## 2019-06-25 RX ADMIN — PATIROMER 8.4 G: 8.4 POWDER, FOR SUSPENSION ORAL at 17:03

## 2019-06-25 RX ADMIN — HYDROCODONE BITARTRATE AND ACETAMINOPHEN 1 TABLET: 5; 325 TABLET ORAL at 23:49

## 2019-06-25 RX ADMIN — LORAZEPAM 1 MG: 1 TABLET ORAL at 23:49

## 2019-06-25 RX ADMIN — SODIUM CHLORIDE 75 ML/HR: 900 INJECTION, SOLUTION INTRAVENOUS at 16:42

## 2019-06-25 RX ADMIN — Medication 10 ML: at 16:42

## 2019-06-25 RX ADMIN — SODIUM BICARBONATE 50 MEQ: 84 INJECTION, SOLUTION INTRAVENOUS at 12:04

## 2019-06-25 RX ADMIN — CALCIUM GLUCONATE 1 G: 98 INJECTION, SOLUTION INTRAVENOUS at 12:38

## 2019-06-25 NOTE — CONSULTS
[facility] CONSULT NOTE Admission Date: 
6/25/2019 Admission Diagnosis: Hyperkalemia [E87.5] Acute kidney injury superimposed on CKD ([address] [N17.9, N18.9] Consulting physician:  Ayana Boston Reason for consult:   Hyperkalemia and JJ Subjective:  
History of Present Illness: Ms. Drake Mott is an 79 yo F with a PMH of CHF, afib and CKD who was sent to the ED by her PCP after she was found to have a K of 6.8 on outpaitent labs yesterday. Labs in the ED today, show persistent hyperkalemia of 6.0 as well as an JJ with Cr up ot 2.7 from baseline of approximately 1.5-1.9. History is obtained through family at bedside who state that she has not been eating/drinking normally since she had esophageal dilation 2 weeks ago. Of note, she is on both an ARB and spironolactone as an outpatient. Past Medical History:  
Diagnosis Date  Abnormal coagulation profile  Anxiety 1/12/2018  Arthritis  Atrial fibrillation ([address] 11/15/2012  
 and SSS and has St vinicio pacer  Benign neoplasm of colon  CAD (coronary artery disease)   
 valve replacement in 2006 (patient unsure which valve and not noted in Dr Pederson [place] note  Depressive disorder, not elsewhere classified  Edema  Encounter for long-term (current) use of other medications  Esophageal reflux  Essential hypertension, benign  GERD (gastroesophageal reflux disease)   
 controlled with omperazole  Gout, unspecified   
 patient and family denies  Hearing loss  Hypertension  Insomnia, unspecified  Loss of height  Lumbago  Nervousness(799.21)  Osteoarthrosis involving, or with mention of more than one site, but not specified as generalized, multiple sites  Osteopenia  Other and unspecified hyperlipidemia  Other dyspnea and respiratory abnormality  Other malaise and fatigue  Pain in limb  Polymyalgia rheumatica ([address]  Premature menopause 4/10/2015  Renal insufficiency   
 patient and family denies, last creatine was 0.9 12/2015  Unspecified deficiency anemia  Unspecified fall  Unspecified hereditary and idiopathic peripheral neuropathy  Urinary frequency  Urinary tract infection, site not specified Past Surgical History:  
Procedure Laterality Date  HX APPENDECTOMY  age 25  
 HX CHOLECYSTECTOMY  2002 [address] VALVE SURGERY  2006 Replacement, family and patient not sure which valve, note noted in cardiology note  HX HYSTERECTOMY  HX KNEE REPLACEMENT Left 12/15/2011  HX KNEE REPLACEMENT Right 10/05/2005 Dr Aby Flores  HX LUMBAR LAMINECTOMY  HX LUMBAR LAMINECTOMY  HX PACEMAKER  11/15/12 Dual chamber, non rate responsive (implantable) st vinicio Current Facility-Administered Medications Medication Dose Route Frequency  tuberculin injection 5 Units  5 Units IntraDERMal ONCE  
 [START ON 6/26/2019] amLODIPine (NORVASC) tablet 2.5 mg  2.5 mg Oral DAILY  [START ON 6/26/2019] buPROPion SR (WELLBUTRIN SR) tablet 150 mg  150 mg Oral DAILY  [START ON 6/26/2019] citalopram (CELEXA) tablet 40 mg  40 mg Oral DAILY  HYDROcodone-acetaminophen (NORCO) 5-325 mg per tablet 1 Tab  1 Tab Oral Q8H PRN  
 LORazepam (ATIVAN) tablet 1 mg  1 mg Oral BID PRN  
 nitroglycerin (NITROSTAT) tablet 0.4 mg  0.4 mg SubLINGual Q5MIN PRN  
 [START ON 6/26/2019] rivaroxaban (XARELTO) tablet 20 mg  20 mg Oral DAILY  sodium chloride (NS) flush 5-40 mL  5-40 mL IntraVENous Q8H  
 sodium chloride (NS) flush 5-40 mL  5-40 mL IntraVENous PRN  
 morphine injection 1 mg  1 mg IntraVENous Q4H PRN  
 acetaminophen (TYLENOL) tablet 650 mg  650 mg Oral Q4H PRN  
 ondansetron (ZOFRAN) injection 4 mg  4 mg IntraVENous Q4H PRN  
 0.9% sodium chloride infusion  75 mL/hr IntraVENous CONTINUOUS Current Outpatient Medications Medication Sig  
 ondansetron (ZOFRAN ODT) 4 mg disintegrating tablet Take 1 Tab by mouth every eight (8) hours as needed for Nausea.  fluticasone propionate (FLONASE) 50 mcg/actuation nasal spray 2 Sprays by Both Nostrils route daily.  LORazepam (ATIVAN) 1 mg tablet TAKE ONE TABLET BY MOUTH TWICE DAILY AS NEEDED FOR ANXIETY  Indications: anxious  amLODIPine (NORVASC) 2.5 mg tablet Take 1 Tab by mouth daily.  [START ON 8/5/2019] HYDROcodone-acetaminophen (NORCO) 5-325 mg per tablet Take 1 Tab by mouth every eight (8) hours as needed for Pain for up to 30 days. Max Daily Amount: 3 Tabs.  furosemide (LASIX) 20 mg tablet Take 1 tablet by mouth daily.  citalopram (CELEXA) 40 mg tablet Take 1 tablet by mouth daily.  nystatin (MYCOSTATIN) topical cream Apply 1 g to affected area two (2) times a day.  pantoprazole (PROTONIX) 40 mg tablet Take 1 Tab by mouth daily.  buPROPion XL (WELLBUTRIN XL) 150 mg tablet Take 1 Tab by mouth every morning.  spironolactone (ALDACTONE) 25 mg tablet Take 1 Tab by mouth daily.  diclofenac (VOLTAREN) 1 % gel Apply  to affected area four (4) times daily.  olmesartan (BENICAR) 20 mg tablet Take 1 Tab by mouth daily.  rivaroxaban (XARELTO) 20 mg tab tablet Take 1 Tab by mouth daily.  nitroglycerin (NITROSTAT) 0.4 mg SL tablet by SubLINGual route every five (5) minutes as needed for Chest Pain.  FOLIC ACID/MV,FE,OTHER MIN (CENTRUM PO) Take  by mouth daily. Allergies Allergen Reactions  Prednisolone Not Reported This Time S/s: heart racing per family member  Prednisone Unknown (comments)  Protuss [Hydrocodone-Potassium Guaiaco] Other (comments) Sleepless, jittery Social History Tobacco Use  Smoking status: Never Smoker  Smokeless tobacco: Never Used Substance Use Topics  Alcohol use: No  
  Alcohol/week: 0.0 oz Family History Problem Relation Age of Onset  Pacemaker Mother  Heart Disease Mother  Cancer Father  Pacemaker Brother  Heart Disease Brother  Diabetes Sister  Diabetes Sister Review of Systems- UTO 2/2 patient extremely [facility] Objective:  
Vitals:  
 06/25/19 1241 06/25/19 1249 06/25/19 1341 06/25/19 1429 BP: 135/71  144/67 Pulse: 66 62 62 Resp:      
Temp:      
SpO2: 90% 96% 99% 97% Weight:      
Height:      
 
No intake or output data in the 24 hours ending 06/25/19 1547 Physical Exam 
GEN :in no distress, alert and oriented HEENT: anicteric sclerae, eomi. Mucous membranes are moist. 
Neck - supple without JVD, no thyromegaly. No lymphadenopathy. CV - regular rate and rhythm, no murmur, no rub Lung - clear bilaterally, lungs expand symmetrically Abd - soft, nontender, bowel sounds present, no hepatosplenomegaly Ext - trace edema Neurologic - heard of hearing, nonfocal 
Genitourinary - bladder nonpalpable Skin - no rashes, no purpura, no ecchymoses Psychiatric: Normal mood and affect. Data Review:  
Recent Labs  
  06/25/19 
1139 06/24/19 
1519 WBC 4.6 4.9 HGB 10.8* 10.9* HCT 33.7* 34.3  176 Recent Labs  
  06/25/19 
1139 06/24/19 
1519  139  
K 6.0* 6.8*  
* 107* CO2 23 19* BUN 55* 56* CREA 2.68* 2.68* * 79  
CA 9.6 9.8 MG  --  2.1 No results for input(s): PH, PCO2, PO2, PCO2 in the last 72 hours. Problem List:  
 
Patient Active Problem List  
 Diagnosis Date Noted  Hyperkalemia 06/25/2019  Acute kidney injury superimposed on CKD ([facility] 06/25/2019  Chronic pain 06/25/2019  CAD (coronary artery disease) 03/07/2019  Anxiety 01/12/2018  Irritable bowel syndrome with both constipation and diarrhea 12/19/2017  
 SSS (sick sinus syndrome) ([facility] 12/06/2017  Vertigo 07/12/2017  Chronic fatigue 07/12/2017  Iron deficiency anemia 11/01/2016  H/O tricuspid valve replacement 08/31/2016  Tricuspid valve disease 08/25/2016  Diastolic CHF, chronic ([facility] 08/25/2016  Rheumatoid arthritis ([facility] 12/31/2015  Hearing loss 12/30/2015  Long term current use of anticoagulant therapy 08/24/2015  Premature menopause 04/10/2015  Encounter for long-term (current) use of medications  Essential hypertension, benign  Hyperlipidemia  Unspecified hereditary and idiopathic peripheral neuropathy  Osteoarthritis  Polymyalgia rheumatica ([facility] [address]  Esophageal reflux  Insomnia  Atrial fibrillation ([facility] [address]  Osteopenia  Depressive disorder Assessment and Plan: 
 
1) JJ- I suspect she is volume depleted 2/2 poor po intake. Agree with gentle IV hydration. Monitor I/O and renal function. Will check UA and urine Na. 2) Hyperkalemia-  2/2 ARB and Aldactone in the setting of mild JJ. Holding meds. Given Ca, albuterol, bicarb, kayexelate in the ED. Hold meds and start Joesph Fang MD

## 2019-06-25 NOTE — PROGRESS NOTES
Bedside and verbal report given to [facility] by Christi Gaitan. Report included the following information SBAR, Kardex, Intake/Output and MAR. Oncoming RN assumed care of the patient.

## 2019-06-25 NOTE — ED NOTES
TRANSFER - OUT REPORT: 
 
Verbal report given to Irma Adkins RN on [facility]  being transferred to [facility] for routine progression of care Report consisted of patients Situation, Background, Assessment and  
Recommendations(SBAR). Information from the following report(s) ED Summary was reviewed with the receiving nurse. Lines:  
Peripheral IV 06/25/19 Left Antecubital (Active) Opportunity for questions and clarification was provided. Patient transported with: 
 Monitor Registered Nurse

## 2019-06-25 NOTE — H&P
Hospitalist H&P Note Admit Date:  2019 11:47 AM  
Name:  Alexandra Lopez Age:  80 y.o. 
:  1932 MRN:  [id number] PCP:  Angelika Zhang MD 
Treatment Team: Primary Nurse: Mica Herman RN Abnormal labs, nausea HPI:  
80 yr old female pt with diastolic chf, tricuspid valve replacement, h/o afib on xarelto,s/p pacemaker,htn,ckd 3-4, polymyalgia rheumatica, chronic pain, anxiety disorder, uses hearing aids. Mild chronic leg swelling. Pt was seen at pcp office yesterday for headache, nausea, decreased appetite. Pt had esophageal dilatation done on 19 for dysphagia. Labs were ordered- pt was called to go to er for elevated k of 6.8. Apart from pt c/o mild nausea,decreased appetite - pt otherwise doesn't c/o headache or chest pain or sob or abdominal pain or palpitation. 
 
ekg- paced rhythm 
k 6,creat 2.68, gfr 18,bun 55. Pt is being admitted for hyperkalemia and acute on ckd 10 systems reviewed and negative except as noted in HPI. Past Medical History:  
Diagnosis Date  Abnormal coagulation profile  Anxiety 2018  Arthritis  Atrial fibrillation ([address] 11/15/2012  
 and SSS and has [facility] pacer  Benign neoplasm of colon  CAD (coronary artery disease)   
 valve replacement in  (patient unsure which valve and not noted in Dr Sangeeta Lee note  Depressive disorder, not elsewhere classified  Edema  Encounter for long-term (current) use of other medications  Esophageal reflux  Essential hypertension, benign  GERD (gastroesophageal reflux disease)   
 controlled with omperazole  Gout, unspecified   
 patient and family denies  Hearing loss  Hypertension  Insomnia, unspecified  Loss of height  Lumbago  Nervousness(799.21)  Osteoarthrosis involving, or with mention of more than one site, but not specified as generalized, multiple sites  Osteopenia  Other and unspecified hyperlipidemia  Other dyspnea and respiratory abnormality  Other malaise and fatigue  Pain in limb  Polymyalgia rheumatica ([address]  Premature menopause 4/10/2015  Renal insufficiency   
 patient and family denies, last creatine was 0.9 12/2015  Unspecified deficiency anemia  Unspecified fall  Unspecified hereditary and idiopathic peripheral neuropathy  Urinary frequency  Urinary tract infection, site not specified Past Surgical History:  
Procedure Laterality Date  HX APPENDECTOMY  age 25  
 HX CHOLECYSTECTOMY  2002 [address] VALVE SURGERY  2006 Replacement, family and patient not sure which valve, note noted in cardiology note  HX HYSTERECTOMY  HX KNEE REPLACEMENT Left 12/15/2011  HX KNEE REPLACEMENT Right 10/05/2005 Dr Jocelyn Stern  HX LUMBAR LAMINECTOMY  HX LUMBAR LAMINECTOMY  HX PACEMAKER  11/15/12 Dual chamber, non rate responsive (implantable) [facility] Allergies Allergen Reactions  Prednisolone Not Reported This Time S/s: heart racing per family member  Prednisone Unknown (comments)  Protuss [Hydrocodone-Potassium Guaiaco] Other (comments) Sleepless, jittery Social History Tobacco Use  Smoking status: Never Smoker  Smokeless tobacco: Never Used Substance Use Topics  Alcohol use: No  
  Alcohol/week: 0.0 oz Family History Problem Relation Age of Onset  Pacemaker Mother  Heart Disease Mother  Cancer Father  Pacemaker Brother  Heart Disease Brother  Diabetes Sister  Diabetes Sister Immunization History Administered Date(s) Administered  Influenza High Dose Vaccine PF 09/07/2016, 11/14/2017, 10/17/2018  Influenza Vaccine 01/23/2001, 12/28/2004, 09/27/2013, 10/16/2014, 11/18/2015  Pneumococcal Conjugate (PCV-13) 02/27/2015  Pneumococcal Polysaccharide (PPSV-23) 10/11/1999, 10/31/2008  Td 02/28/1998, 09/28/1998  Tdap 09/05/2018 PTA Medications: Prior to Admission Medications Prescriptions Last Dose Informant Patient Reported? Taking? FOLIC ACID/MV,FE,OTHER MIN (CENTRUM PO)   Yes No  
Sig: Take  by mouth daily. HYDROcodone-acetaminophen (NORCO) 5-325 mg per tablet   No No  
Sig: Take 1 Tab by mouth every eight (8) hours as needed for Pain for up to 30 days. Max Daily Amount: 3 Tabs. LORazepam (ATIVAN) 1 mg tablet   No No  
Sig: TAKE ONE TABLET BY MOUTH TWICE DAILY AS NEEDED FOR ANXIETY  Indications: anxious  
amLODIPine (NORVASC) 2.5 mg tablet   No No  
Sig: Take 1 Tab by mouth daily. buPROPion XL (WELLBUTRIN XL) 150 mg tablet   No No  
Sig: Take 1 Tab by mouth every morning. citalopram (CELEXA) 40 mg tablet   No No  
Sig: Take 1 tablet by mouth daily. diclofenac (VOLTAREN) 1 % gel   No No  
Sig: Apply  to affected area four (4) times daily. fluticasone propionate (FLONASE) 50 mcg/actuation nasal spray   No No  
Si Sprays by Both Nostrils route daily. furosemide (LASIX) 20 mg tablet   No No  
Sig: Take 1 tablet by mouth daily. nitroglycerin (NITROSTAT) 0.4 mg SL tablet   Yes No  
Sig: by SubLINGual route every five (5) minutes as needed for Chest Pain. nystatin (MYCOSTATIN) topical cream   No No  
Sig: Apply 1 g to affected area two (2) times a day. olmesartan (BENICAR) 20 mg tablet   No No  
Sig: Take 1 Tab by mouth daily. ondansetron (ZOFRAN ODT) 4 mg disintegrating tablet   No No  
Sig: Take 1 Tab by mouth every eight (8) hours as needed for Nausea. pantoprazole (PROTONIX) 40 mg tablet   No No  
Sig: Take 1 Tab by mouth daily. rivaroxaban (XARELTO) 20 mg tab tablet   No No  
Sig: Take 1 Tab by mouth daily. spironolactone (ALDACTONE) 25 mg tablet   No No  
Sig: Take 1 Tab by mouth daily. Facility-Administered Medications: None Objective:  
 
Patient Vitals for the past 24 hrs: 
 Temp Pulse Resp BP SpO2  
19 1429     97 % 19 1341  62  144/67 99 % 19 1249  62   96 % 06/25/19 1241  66  135/71 90 % 06/25/19 1158     94 % 06/25/19 1135 98.1 °F (36.7 °C) 62 18 121/57 97 % Oxygen Therapy O2 Sat (%): 97 % (06/25/19 1429) Pulse via Oximetry: 84 beats per minute (06/25/19 1429) O2 Device: Room air (06/25/19 1429) No intake or output data in the 24 hours ending 06/25/19 1525 Physical Exam: 
General:    Well nourished. Alert. Eyes:   Normal sclera. Extraocular movements intact. ENT:  Normocephalic, atraumatic. Moist mucous membranes CV:   RRR. No murmur, rub, or gallop. Lungs:  CTAB. No wheezing, rhonchi, or rales. Abdomen: Soft, nontender, nondistended. Bowel sounds normal.  
Extremities: Warm and dry. No cyanosis or edema. Neurologic: CN II-XII grossly intact. Sensation intact. Skin:     No rashes or jaundice. Psych:  Normal mood and affect. I reviewed the labs, imaging, EKGs, telemetry, and other studies done this admission. Data Review:  
Recent Results (from the past 24 hour(s)) CBC WITH AUTOMATED DIFF Collection Time: 06/25/19 11:39 AM  
Result Value Ref Range WBC 4.6 4.3 - 11.1 K/uL  
 RBC 3.54 (L) 4.05 - 5.2 M/uL  
 HGB 10.8 (L) 11.7 - 15.4 g/dL HCT 33.7 (L) 35.8 - 46.3 % MCV 95.2 79.6 - 97.8 FL  
 MCH 30.5 26.1 - 32.9 PG  
 MCHC 32.0 31.4 - 35.0 g/dL  
 RDW 12.1 11.9 - 14.6 % PLATELET 469 878 - 107 K/uL MPV 10.9 9.4 - 12.3 FL ABSOLUTE NRBC 0.00 0.0 - 0.2 K/uL  
 DF AUTOMATED NEUTROPHILS 61 43 - 78 % LYMPHOCYTES 25 13 - 44 % MONOCYTES 8 4.0 - 12.0 % EOSINOPHILS 4 0.5 - 7.8 % BASOPHILS 1 0.0 - 2.0 % IMMATURE GRANULOCYTES 1 0.0 - 5.0 %  
 ABS. NEUTROPHILS 2.8 1.7 - 8.2 K/UL  
 ABS. LYMPHOCYTES 1.1 0.5 - 4.6 K/UL  
 ABS. MONOCYTES 0.4 0.1 - 1.3 K/UL  
 ABS. EOSINOPHILS 0.2 0.0 - 0.8 K/UL  
 ABS. BASOPHILS 0.1 0.0 - 0.2 K/UL  
 ABS. IMM. GRANS. 0.0 0.0 - 0.5 K/UL METABOLIC PANEL, COMPREHENSIVE Collection Time: 06/25/19 11:39 AM  
Result Value Ref Range  Sodium 138 136 - 145 mmol/L  
 Potassium 6.0 (H) 3.5 - 5.1 mmol/L Chloride 109 (H) 98 - 107 mmol/L  
 CO2 23 21 - 32 mmol/L Anion gap 6 (L) 7 - 16 mmol/L Glucose 103 (H) 65 - 100 mg/dL BUN 55 (H) 8 - 23 MG/DL Creatinine 2.68 (H) 0.6 - 1.0 MG/DL  
 GFR est AA 22 (L) >60 ml/min/1.73m2 GFR est non-AA 18 (L) >60 ml/min/1.73m2 Calcium 9.6 8.3 - 10.4 MG/DL Bilirubin, total 0.7 0.2 - 1.1 MG/DL  
 ALT (SGPT) 19 12 - 65 U/L  
 AST (SGOT) 19 15 - 37 U/L Alk. phosphatase 93 50 - 136 U/L Protein, total 7.7 6.3 - 8.2 g/dL Albumin 3.8 3.2 - 4.6 g/dL Globulin 3.9 (H) 2.3 - 3.5 g/dL A-G Ratio 1.0 (L) 1.2 - 3.5 BNP Collection Time: 06/25/19 11:39 AM  
Result Value Ref Range  (H) 0 pg/mL EKG, 12 LEAD, INITIAL Collection Time: 06/25/19 11:40 AM  
Result Value Ref Range Ventricular Rate 63 BPM  
 Atrial Rate 60 BPM  
 QRS Duration 162 ms Q-T Interval 430 ms QTC Calculation (Bezet) 440 ms Calculated R Axis -98 degrees Calculated T Axis 120 degrees Diagnosis Electronic ventricular pacemaker Abnormal ECG When compared with ECG of 16-DEC-2015 14:26, No significant change was found Confirmed by ST SMITHA GUILLEN MD (), FCO MILLS ([id number]) on 6/25/2019 2:08:51 PM 
  
POC CG8I ISTAT, VENOUS Collection Time: 06/25/19  2:26 PM  
Result Value Ref Range Device: ROOM AIR    
 pH, venous (POC) 7.331 7.32 - 7.42    
 pCO2, venous (POC) 44.5 41 - 51 MMHG  
 pO2, venous (POC) 17 (LL) mmHg HCO3, venous (POC) 23.5 23 - 28 MMOL/L  
 sO2, venous (POC) 21 (L) 65 - 88 % Allens test (POC) NOT APPLICABLE Site OTHER Patient temp. 98.6 Specimen type (POC) VENOUS BLOOD Glucose, POC 87 65 - 100 MG/DL Performed by MARY Lindquist Respiratory comment: NurseNotified COLLECT TIME 1,425 All Micro Results None Other Studies: No results found. Assessment and Plan:  
 
Hospital Problems as of 6/25/2019 Date Reviewed: 6/10/2019 Codes Class Noted - Resolved POA * (Principal) Hyperkalemia ICD-10-CM: E87.5 ICD-9-CM: 276.7  6/25/2019 - Present Unknown Acute kidney injury superimposed on CKD ([facility] [address] ICD-10-CM: N17.9, N18.9 ICD-9-CM: 866.00, 585.9  6/25/2019 - Present Unknown Chronic pain ICD-10-CM: G89.29 ICD-9-CM: 338.29  6/25/2019 - Present Unknown CAD (coronary artery disease) ICD-10-CM: I25.10 ICD-9-CM: 414.00  3/7/2019 - Present Yes Overview Signed 3/7/2019  2:09 PM by Jorge Vergara MD  
  valve replacement in 2006 (patient unsure which valve and not noted in Dr Jessica Gallegos note Anxiety ICD-10-CM: F41.9 ICD-9-CM: 300.00  1/12/2018 - Present Yes  
   
 H/O tricuspid valve replacement ICD-10-CM: Z95.2 ICD-9-CM: V43.3  8/31/2016 - Present Yes Overview Signed 8/31/2016  2:09 PM by Jorge Vergara MD  
  [address] Long term current use of anticoagulant therapy ICD-10-CM: Z79.01 
ICD-9-CM: V58.61  8/24/2015 - Present Yes Essential hypertension, benign ICD-10-CM: I10 
ICD-9-CM: 401.1  Unknown - Present Yes Hyperlipidemia ICD-10-CM: E78.5 ICD-9-CM: 272.4  Unknown - Present Yes Polymyalgia rheumatica (HCC) ICD-10-CM: M35.3 ICD-9-CM: 266  Unknown - Present Yes Esophageal reflux ICD-10-CM: K21.9 ICD-9-CM: 530.81  Unknown - Present Yes Depressive disorder ICD-10-CM: F32.9 ICD-9-CM: 311  Unknown - Present Yes PLAN: 
Acute on ckd- decreased po intake and on spironolactone and ace inh- held the meds- cont ivf-- Pt was supposed to [facility] nephrology -first appointment tomorrow- will consult nephrology. Hyperkalemia- sec to acute on ckd. -- pt was given calcium gluconate, was ordered bicarb,kayexalate in er. Cad with stable diastolic chf  And h/o tricuspid valve replacement Recent esophageal dilatation on 6/14/19. 
htn On xarelto- h/o afib Chronic pain Advanced life care discussed with pt - pt is DNR. DVT ppx:  xarelto Anticipated DC needs:   
Code status:  DNR Estimated LOS:  Greater than 2 midnights Risk:  high Signed: 
Stephanie Camacho MD

## 2019-06-25 NOTE — PROGRESS NOTES
TRANSFER - IN REPORT: 
 
Verbal report received from April, RN on [facility] being received from ER for routine progression of care Report consisted of patients Situation, Background, Assessment and Recommendations(SBAR). Information from the following report(s) SBAR, Kardex, Intake/Output and MAR was reviewed with the receiving nurse. Opportunity for questions and clarification was provided. Assessment completed upon patients arrival to unit and care assumed. Telemetry monitor applied, bed low and locked, side rails x2. Pt oriented to room and instructed to call for assistance. Dual skin assessment completed with secondary RN which reveals sacrum and heels are intact. Mid sternal CABG scar present, various healed surgery scars present. BLE 2+ pitting edema.

## 2019-06-25 NOTE — PROGRESS NOTES
Initial visit to assess pt's spiritual needs. Feeling today? OK Receiving good care?  yes Needs from Spiritual Care:  Not now Ministry of presence & prayer to demonstrate caring & concern, convey emotional & spiritual support.  
 
mike Stack MDiv,[facility],PhD

## 2019-06-25 NOTE — ED TRIAGE NOTES
Pt sent to ER by PCP after having blood work done yesterday. Per pt's granddaughter they were told by PCP that kidney function was elevated along with potassium level of 6.8.

## 2019-06-26 ENCOUNTER — HOME CARE VISIT (OUTPATIENT)
Dept: SCHEDULING | Facility: HOME HEALTH | Age: 84
End: 2019-06-26

## 2019-06-26 LAB
ANION GAP SERPL CALC-SCNC: 7 MMOL/L (ref 7–16)
APPEARANCE UR: CLEAR
BACTERIA URNS QL MICRO: 0 /HPF
BILIRUB UR QL: NEGATIVE
BUN SERPL-MCNC: 53 MG/DL (ref 8–23)
CALCIUM SERPL-MCNC: 9.4 MG/DL (ref 8.3–10.4)
CASTS URNS QL MICRO: ABNORMAL /LPF
CHLORIDE SERPL-SCNC: 111 MMOL/L (ref 98–107)
CO2 SERPL-SCNC: 23 MMOL/L (ref 21–32)
COLOR UR: YELLOW
CREAT SERPL-MCNC: 2.32 MG/DL (ref 0.6–1)
EPI CELLS #/AREA URNS HPF: 0 /HPF
GLUCOSE SERPL-MCNC: 85 MG/DL (ref 65–100)
GLUCOSE UR STRIP.AUTO-MCNC: NEGATIVE MG/DL
HGB UR QL STRIP: NEGATIVE
KETONES UR QL STRIP.AUTO: NEGATIVE MG/DL
LEUKOCYTE ESTERASE UR QL STRIP.AUTO: ABNORMAL
MM INDURATION POC: 0 MM (ref 0–5)
NITRITE UR QL STRIP.AUTO: NEGATIVE
PH UR STRIP: 5.5 [PH] (ref 5–9)
POTASSIUM SERPL-SCNC: 5.5 MMOL/L (ref 3.5–5.1)
PPD POC: NEGATIVE NEGATIVE
PROT UR STRIP-MCNC: NEGATIVE MG/DL
RBC #/AREA URNS HPF: ABNORMAL /HPF
SODIUM SERPL-SCNC: 141 MMOL/L (ref 136–145)
SODIUM UR-SCNC: 62 MMOL/L
SP GR UR REFRACTOMETRY: 1.01 (ref 1–1.02)
UROBILINOGEN UR QL STRIP.AUTO: 0.2 EU/DL (ref 0.2–1)
WBC URNS QL MICRO: ABNORMAL /HPF

## 2019-06-26 PROCEDURE — 80048 BASIC METABOLIC PNL TOTAL CA: CPT

## 2019-06-26 PROCEDURE — 65660000000 HC RM CCU STEPDOWN

## 2019-06-26 PROCEDURE — 81001 URINALYSIS AUTO W/SCOPE: CPT

## 2019-06-26 PROCEDURE — 36415 COLL VENOUS BLD VENIPUNCTURE: CPT

## 2019-06-26 PROCEDURE — 77030020263 HC SOL INJ SOD CL0.9% LFCR 1000ML

## 2019-06-26 PROCEDURE — 84300 ASSAY OF URINE SODIUM: CPT

## 2019-06-26 PROCEDURE — 74011250636 HC RX REV CODE- 250/636: Performed by: FAMILY MEDICINE

## 2019-06-26 PROCEDURE — 74011250637 HC RX REV CODE- 250/637: Performed by: FAMILY MEDICINE

## 2019-06-26 PROCEDURE — 74011250637 HC RX REV CODE- 250/637: Performed by: INTERNAL MEDICINE

## 2019-06-26 RX ADMIN — CITALOPRAM HYDROBROMIDE 40 MG: 20 TABLET ORAL at 08:26

## 2019-06-26 RX ADMIN — LORAZEPAM 1 MG: 1 TABLET ORAL at 13:06

## 2019-06-26 RX ADMIN — Medication 10 ML: at 06:00

## 2019-06-26 RX ADMIN — PATIROMER 8.4 G: 8.4 POWDER, FOR SUSPENSION ORAL at 08:26

## 2019-06-26 RX ADMIN — LORAZEPAM 1 MG: 1 TABLET ORAL at 22:15

## 2019-06-26 RX ADMIN — SODIUM CHLORIDE 75 ML/HR: 900 INJECTION, SOLUTION INTRAVENOUS at 08:33

## 2019-06-26 RX ADMIN — RIVAROXABAN 15 MG: 15 TABLET, FILM COATED ORAL at 17:34

## 2019-06-26 RX ADMIN — AMLODIPINE BESYLATE 2.5 MG: 5 TABLET ORAL at 08:26

## 2019-06-26 RX ADMIN — BUPROPION HYDROCHLORIDE 150 MG: 150 TABLET, EXTENDED RELEASE ORAL at 08:26

## 2019-06-26 NOTE — PROGRESS NOTES
[facility] PROGRESS NOTE Follow up for: 
 
Subjective:  
Patient seen and examined. Sleeping peacefully ROS:  (per family at the bedside) Gen - no fever, no chills CV - no chest pain, no orthopnea Lung - no shortness of breath, no cough Ext - no edema Objective:  
Exam: 
Vitals:  
 06/25/19 2131 06/26/19 0107 06/26/19 7342 06/26/19 7596 BP: 134/63 120/66 120/62 126/56 Pulse: 66 69 68 61 Resp: 18 18 18 18 Temp: 97.6 °F (36.4 °C) 98.7 °F (37.1 °C) 98.3 °F (36.8 °C) 97.2 °F (36.2 °C) SpO2: 98% 95% 96% 95% Weight:   79.8 kg (176 lb) Height:      
 
 
 
Intake/Output Summary (Last 24 hours) at 6/26/2019 1004 Last data filed at 6/26/2019 3751 Gross per 24 hour Intake 590 ml Output 1850 ml Net -1260 ml  
 
 
Current Facility-Administered Medications Medication Dose Route Frequency  rivaroxaban (XARELTO) tablet 15 mg  15 mg Oral DAILY WITH DINNER  
 tuberculin injection 5 Units  5 Units IntraDERMal ONCE  
 amLODIPine (NORVASC) tablet 2.5 mg  2.5 mg Oral DAILY  buPROPion SR (WELLBUTRIN SR) tablet 150 mg  150 mg Oral DAILY  citalopram (CELEXA) tablet 40 mg  40 mg Oral DAILY  HYDROcodone-acetaminophen (NORCO) 5-325 mg per tablet 1 Tab  1 Tab Oral Q8H PRN  
 LORazepam (ATIVAN) tablet 1 mg  1 mg Oral BID PRN  
 nitroglycerin (NITROSTAT) tablet 0.4 mg  0.4 mg SubLINGual Q5MIN PRN  
 sodium chloride (NS) flush 5-40 mL  5-40 mL IntraVENous Q8H  
 sodium chloride (NS) flush 5-40 mL  5-40 mL IntraVENous PRN  
 morphine injection 1 mg  1 mg IntraVENous Q4H PRN  
 acetaminophen (TYLENOL) tablet 650 mg  650 mg Oral Q4H PRN  
 ondansetron (ZOFRAN) injection 4 mg  4 mg IntraVENous Q4H PRN  
 0.9% sodium chloride infusion  75 mL/hr IntraVENous CONTINUOUS  
 patiromer calcium sorbitex (VELTASSA) powder 8.4 g  8.4 g Oral DAILY  hydrALAZINE (APRESOLINE) 20 mg/mL injection 10 mg  10 mg IntraVENous Q6H PRN  
 
 
EXAM 
GEN - Alert, oriented, in no distress CV - S1, S2, RRR, no rub, murmur, or gallop Lung - clear to auscultation bilaterally Abd - soft, nontender, BS present Ext - no edema Recent Labs  
  06/25/19 
1139 06/24/19 
1519 WBC 4.6 4.9 HGB 10.8* 10.9* HCT 33.7* 34.3  176 Recent Labs  
  06/26/19 
0456 06/25/19 
1139 06/24/19 
1519  138 139  
K 5.5* 6.0* 6.8*  
* 109* 107* CO2 23 23 19* BUN 53* 55* 56* CREA 2.32* 2.68* 2.68* CA 9.4 9.6 9.8 GLU 85 103* 79 MG  --   --  2.1 Assessment and Plan:  
1) JJ- I suspect she is volume depleted 2/2 poor po intake. Agree with gentle IV hydration. Monitor I/O and renal function. Will check UA and urine Na. 2) Hyperkalemia-  2/2 ARB and Aldactone in the setting of mild JJ. Holding meds. better today. Continue veltassa.  
 
 
 
Lola Velázquez MD

## 2019-06-26 NOTE — PROGRESS NOTES
Care Management Interventions PCP Verified by CM: Yes(last seen Monday 6/24/19) Palliative Care Criteria Met (RRAT>21 & CHF Dx)?: No(RRAT 24 Dx Hyperkalemia, JJ ) Transition of Care Consult (CM Consult): Discharge Planning Discharge Durable Medical Equipment: No(walker, wheelchair, shower chair, and grab bars) Physical Therapy Consult: No 
Occupational Therapy Consult: No 
Speech Therapy Consult: No 
Current Support Network: Lives Alone(lives alone however family stays with her around the clock most of the time) Confirm Follow Up Transport: Family Plan discussed with Pt/Family/Caregiver: Yes Freedom of Choice Offered: Yes Discharge Location Discharge Placement: Home with home health Met with patient for d/c planning. Patient alert and oriented x 3, independent of ADL's except she does not cook and lives alone in one story home with one step into the kitchen. Her family has been staying with her around the clock most of the time. She is only alone at most 1-2 hours for errands etc. She has 2 daughters that lives next door to her to assist. DME includes walker, wheelchair, shower chair and grab bar. She does not drive but has transportation provided by her family. She has Medicare and [facility] and is able to obtain her medications without difficulty at [employer] [facility] in [address] [place] [address]. Current d/c plan is home with home health and family when medically stable. CM following.

## 2019-06-26 NOTE — PROGRESS NOTES
Bedside and Verbal shift change report given to self (oncoming nurse) by Lorrie Boudreaux (offgoing nurse). Report included the following information SBAR, Kardex, Intake/Output and MAR.

## 2019-06-26 NOTE — PROGRESS NOTES
Bedside and verbal report given to [facility] by Smiley Phelan. Report included the following information SBAR, Kardex, Intake/Output and MAR. Oncoming RN assumed care of the patient.

## 2019-06-26 NOTE — PROGRESS NOTES
Patient with yellow non-skid socks in place, call light within reach, bed in low locked position with rails up x2. Bed alarm on. Verbalizes understanding to call for any assistance. Problem: Falls - Risk of 
Goal: *Absence of Falls Description Document Francisco Colvin Fall Risk and appropriate interventions in the flowsheet.  
Outcome: Progressing Towards Goal

## 2019-06-26 NOTE — PROGRESS NOTES
Verbal bedside report given to sunitha Kimble RN. Patient's situation, background, assessment and recommendations provided. Opportunity for questions provided. Oncoming RN assumed care of patient. Bed alarm on.

## 2019-06-26 NOTE — PROGRESS NOTES
Bedside and Verbal shift change report given to self (oncoming nurse) by Cyndie Penn RN (offgoing nurse). Report included the following information SBAR, Kardex, MAR and Recent Results.

## 2019-06-26 NOTE — PROGRESS NOTES
Hospitalist Progress Note Admit Date:  2019 11:47 AM  
Name:  Grace Worrell Age:  80 y.o. 
:  1932 MRN:  [id number] PCP:  Evens Rosado MD 
Treatment Team: Attending Provider: Raza Hines MD; Consulting Provider: Richie Marc MD; Utilization Review: Andrew Powers RN Subjective:  
80 yr old female pt with diastolic chf, tricuspid valve replacement, h/o afib on xarelto,s/p pacemaker,htn,ckd 3-4, polymyalgia rheumatica, chronic pain, anxiety disorder, uses hearing aids. Mild chronic leg swelling. 
  
Pt was seen at pcp office yesterday for headache, nausea, decreased appetite. Pt had esophageal dilatation done on 19 for dysphagia. Labs were ordered- pt was called to go to er for elevated k of 6.8. 
  
Apart from pt c/o mild nausea,decreased appetite - pt otherwise doesn't c/o headache or chest pain or sob or abdominal pain or palpitation. 
  
ekg- paced rhythm 
k 6,creat 2.68, gfr 18,bun 55. 
  
Pt is being admitted for hyperkalemia and acute on ckd 
 
19 Says doing ok Family at bedside 
k 5.5 Mild improving creat Objective:  
 
Patient Vitals for the past 24 hrs: 
 Temp Pulse Resp BP SpO2  
19 0826 97.2 °F (36.2 °C) 61 18 126/56 95 % 19 0531 98.3 °F (36.8 °C) 68 18 120/62 96 %  
19 0107 98.7 °F (37.1 °C) 69 18 120/66 95 % 19 2131 97.6 °F (36.4 °C) 66 18 134/63 98 %  
19 1723 97.9 °F (36.6 °C) 65 20 125/79 96 %  
19 1600  60     
19 1512  64     
19 1429     97 % 19 1341  62  144/67 99 % 19 1249  62   96 %  
19 1241  66  135/71 90 % 19 1158     94 % 19 1135 98.1 °F (36.7 °C) 62 18 121/57 97 % Oxygen Therapy O2 Sat (%): 95 % (19 08) Pulse via Oximetry: 84 beats per minute (19 1429) O2 Device: Room air (19 08) Intake/Output Summary (Last 24 hours) at 2019 5736 Last data filed at 2019 5243 Gross per 24 hour Intake 590 ml Output 1850 ml Net -1260 ml General:    Well nourished. Alert.   
heent- normal 
CV:   RRR. No murmur, rub, or gallop. Lungs:   Clear to auscultation bilaterally. No wheezing, rhonchi, or rales. Abdomen:   Soft, nontender, nondistended. Cns- no focal neurological deficits Extremities: Warm and dry. No cyanosis or edema. Skin:     No rashes or jaundice. Data Review: 
I have reviewed all labs, meds, telemetry events, and studies from the last 24 hours. Recent Results (from the past 24 hour(s)) CBC WITH AUTOMATED DIFF Collection Time: 06/25/19 11:39 AM  
Result Value Ref Range WBC 4.6 4.3 - 11.1 K/uL  
 RBC 3.54 (L) 4.05 - 5.2 M/uL  
 HGB 10.8 (L) 11.7 - 15.4 g/dL HCT 33.7 (L) 35.8 - 46.3 % MCV 95.2 79.6 - 97.8 FL  
 MCH 30.5 26.1 - 32.9 PG  
 MCHC 32.0 31.4 - 35.0 g/dL  
 RDW 12.1 11.9 - 14.6 % PLATELET 680 716 - 547 K/uL MPV 10.9 9.4 - 12.3 FL ABSOLUTE NRBC 0.00 0.0 - 0.2 K/uL  
 DF AUTOMATED NEUTROPHILS 61 43 - 78 % LYMPHOCYTES 25 13 - 44 % MONOCYTES 8 4.0 - 12.0 % EOSINOPHILS 4 0.5 - 7.8 % BASOPHILS 1 0.0 - 2.0 % IMMATURE GRANULOCYTES 1 0.0 - 5.0 %  
 ABS. NEUTROPHILS 2.8 1.7 - 8.2 K/UL  
 ABS. LYMPHOCYTES 1.1 0.5 - 4.6 K/UL  
 ABS. MONOCYTES 0.4 0.1 - 1.3 K/UL  
 ABS. EOSINOPHILS 0.2 0.0 - 0.8 K/UL  
 ABS. BASOPHILS 0.1 0.0 - 0.2 K/UL  
 ABS. IMM. GRANS. 0.0 0.0 - 0.5 K/UL METABOLIC PANEL, COMPREHENSIVE Collection Time: 06/25/19 11:39 AM  
Result Value Ref Range Sodium 138 136 - 145 mmol/L Potassium 6.0 (H) 3.5 - 5.1 mmol/L Chloride 109 (H) 98 - 107 mmol/L  
 CO2 23 21 - 32 mmol/L Anion gap 6 (L) 7 - 16 mmol/L Glucose 103 (H) 65 - 100 mg/dL BUN 55 (H) 8 - 23 MG/DL Creatinine 2.68 (H) 0.6 - 1.0 MG/DL  
 GFR est AA 22 (L) >60 ml/min/1.73m2 GFR est non-AA 18 (L) >60 ml/min/1.73m2 Calcium 9.6 8.3 - 10.4 MG/DL  Bilirubin, total 0.7 0.2 - 1.1 MG/DL  
 ALT (SGPT) 19 12 - 65 U/L  
 AST (SGOT) 19 15 - 37 U/L Alk. phosphatase 93 50 - 136 U/L Protein, total 7.7 6.3 - 8.2 g/dL Albumin 3.8 3.2 - 4.6 g/dL Globulin 3.9 (H) 2.3 - 3.5 g/dL A-G Ratio 1.0 (L) 1.2 - 3.5 BNP Collection Time: 06/25/19 11:39 AM  
Result Value Ref Range  (H) 0 pg/mL EKG, 12 LEAD, INITIAL Collection Time: 06/25/19 11:40 AM  
Result Value Ref Range Ventricular Rate 63 BPM  
 Atrial Rate 60 BPM  
 QRS Duration 162 ms Q-T Interval 430 ms QTC Calculation (Bezet) 440 ms Calculated R Axis -98 degrees Calculated T Axis 120 degrees Diagnosis Electronic ventricular pacemaker Abnormal ECG When compared with ECG of 16-DEC-2015 14:26, No significant change was found Confirmed by ST SMITHA GUILLEN MD (), FCO MILLS ([id number]) on 6/25/2019 2:08:51 PM 
  
POC CG8I ISTAT, VENOUS Collection Time: 06/25/19  2:26 PM  
Result Value Ref Range Device: ROOM AIR    
 pH, venous (POC) 7.331 7.32 - 7.42    
 pCO2, venous (POC) 44.5 41 - 51 MMHG  
 pO2, venous (POC) 17 (LL) mmHg HCO3, venous (POC) 23.5 23 - 28 MMOL/L  
 sO2, venous (POC) 21 (L) 65 - 88 % Allens test (POC) NOT APPLICABLE Site OTHER Patient temp. 98.6 Specimen type (POC) VENOUS BLOOD Sodium,  135 - 145 MMOL/L Potassium, POC 6.2 (HH) 3.5 - 5.5 MMOL/L Glucose, POC 87 65 - 100 MG/DL Calcium, ionized (POC) 1.50 (H) 1.12 - 1.32 mmol/L Performed by MARY Lindquist Respiratory comment: NurseNotified COLLECT TIME 1,425 METABOLIC PANEL, BASIC Collection Time: 06/26/19  4:56 AM  
Result Value Ref Range Sodium 141 136 - 145 mmol/L Potassium 5.5 (H) 3.5 - 5.1 mmol/L Chloride 111 (H) 98 - 107 mmol/L  
 CO2 23 21 - 32 mmol/L Anion gap 7 7 - 16 mmol/L Glucose 85 65 - 100 mg/dL BUN 53 (H) 8 - 23 MG/DL Creatinine 2.32 (H) 0.6 - 1.0 MG/DL  
 GFR est AA 26 (L) >60 ml/min/1.73m2 GFR est non-AA 21 (L) >60 ml/min/1.73m2  Calcium 9.4 8.3 - 10.4 MG/DL  
 URINALYSIS W/ RFLX MICROSCOPIC Collection Time: 06/26/19  5:30 AM  
Result Value Ref Range Color YELLOW Appearance CLEAR Specific gravity 1.009 1.001 - 1.023    
 pH (UA) 5.5 5.0 - 9.0 Protein NEGATIVE  NEG mg/dL Glucose NEGATIVE  mg/dL Ketone NEGATIVE  NEG mg/dL Bilirubin NEGATIVE  NEG Blood NEGATIVE  NEG Urobilinogen 0.2 0.2 - 1.0 EU/dL Nitrites NEGATIVE  NEG Leukocyte Esterase TRACE (A) NEG    
 WBC 0-3 0 /hpf  
 RBC 3-5 0 /hpf Epithelial cells 0 0 /hpf Bacteria 0 0 /hpf Casts 0-3 0 /lpf SODIUM, UR, RANDOM Collection Time: 06/26/19  5:30 AM  
Result Value Ref Range Sodium,urine random 62 MMOL/L All Micro Results None Current Meds: 
Current Facility-Administered Medications Medication Dose Route Frequency  rivaroxaban (XARELTO) tablet 15 mg  15 mg Oral DAILY WITH DINNER  
 tuberculin injection 5 Units  5 Units IntraDERMal ONCE  
 amLODIPine (NORVASC) tablet 2.5 mg  2.5 mg Oral DAILY  buPROPion SR (WELLBUTRIN SR) tablet 150 mg  150 mg Oral DAILY  citalopram (CELEXA) tablet 40 mg  40 mg Oral DAILY  HYDROcodone-acetaminophen (NORCO) 5-325 mg per tablet 1 Tab  1 Tab Oral Q8H PRN  
 LORazepam (ATIVAN) tablet 1 mg  1 mg Oral BID PRN  
 nitroglycerin (NITROSTAT) tablet 0.4 mg  0.4 mg SubLINGual Q5MIN PRN  
 sodium chloride (NS) flush 5-40 mL  5-40 mL IntraVENous Q8H  
 sodium chloride (NS) flush 5-40 mL  5-40 mL IntraVENous PRN  
 morphine injection 1 mg  1 mg IntraVENous Q4H PRN  
 acetaminophen (TYLENOL) tablet 650 mg  650 mg Oral Q4H PRN  
 ondansetron (ZOFRAN) injection 4 mg  4 mg IntraVENous Q4H PRN  
 0.9% sodium chloride infusion  75 mL/hr IntraVENous CONTINUOUS  
 patiromer calcium sorbitex (VELTASSA) powder 8.4 g  8.4 g Oral DAILY  hydrALAZINE (APRESOLINE) 20 mg/mL injection 10 mg  10 mg IntraVENous Q6H PRN Other Studies (last 24 hours): No results found. Assessment and Plan: Hospital Problems as of 6/26/2019 Date Reviewed: 6/10/2019 Codes Class Noted - Resolved POA * (Principal) Hyperkalemia ICD-10-CM: E87.5 ICD-9-CM: 276.7  6/25/2019 - Present Unknown Acute kidney injury superimposed on CKD ([facility] [address] ICD-10-CM: N17.9, N18.9 ICD-9-CM: 866.00, 585.9  6/25/2019 - Present Unknown Chronic pain ICD-10-CM: G89.29 ICD-9-CM: 338.29  6/25/2019 - Present Unknown CAD (coronary artery disease) ICD-10-CM: I25.10 ICD-9-CM: 414.00  3/7/2019 - Present Yes Overview Signed 3/7/2019  2:09 PM by Jorge Vergara MD  
  valve replacement in 2006 (patient unsure which valve and not noted in Dr Jessica Gallegos note Anxiety ICD-10-CM: F41.9 ICD-9-CM: 300.00  1/12/2018 - Present Yes  
   
 H/O tricuspid valve replacement ICD-10-CM: Z95.2 ICD-9-CM: V43.3  8/31/2016 - Present Yes Overview Signed 8/31/2016  2:09 PM by Jorge Vergara MD  
  [facility] Long [facility] current use of anticoagulant therapy ICD-10-CM: Z79.01 
ICD-9-CM: V58.61  8/24/2015 - Present Yes Essential hypertension, benign ICD-10-CM: I10 
ICD-9-CM: 401.1  Unknown - Present Yes Hyperlipidemia ICD-10-CM: E78.5 ICD-9-CM: 272.4  Unknown - Present Yes Polymyalgia rheumatica (HCC) ICD-10-CM: M35.3 ICD-9-CM: 789  Unknown - Present Yes Esophageal reflux ICD-10-CM: K21.9 ICD-9-CM: 530.81  Unknown - Present Yes Depressive disorder ICD-10-CM: F32.9 ICD-9-CM: 311  Unknown - Present Yes PLAN:   
Acute on ckd- mild improving creat - on ivf Hyperkalemia- sec to acute on ckd.-and meds- on veltassa-- k 5.5 Cad with stable diastolic chf  And h/o tricuspid valve replacement Recent esophageal dilatation on 6/14/19. 
htn On xarelto- h/o afib Chronic pain 
  
Advanced life care discussed with pt - pt is DNR. 
  
DVT ppx:  Nino Rodrigues Anticipated DC needs:   
Code status:  DNR Estimated LOS:  Greater than 2 midnights Risk:  high 
  
 
 
 
 DC planning/Dispo: DVT ppx:   
 
Signed: 
Martha Campos MD

## 2019-06-27 VITALS
OXYGEN SATURATION: 95 % | HEART RATE: 67 BPM | DIASTOLIC BLOOD PRESSURE: 71 MMHG | HEIGHT: 65 IN | WEIGHT: 174.4 LBS | SYSTOLIC BLOOD PRESSURE: 129 MMHG | BODY MASS INDEX: 29.06 KG/M2 | RESPIRATION RATE: 18 BRPM | TEMPERATURE: 98 F

## 2019-06-27 LAB
ANION GAP SERPL CALC-SCNC: 7 MMOL/L (ref 7–16)
BUN SERPL-MCNC: 35 MG/DL (ref 8–23)
CALCIUM SERPL-MCNC: 9.5 MG/DL (ref 8.3–10.4)
CHLORIDE SERPL-SCNC: 114 MMOL/L (ref 98–107)
CO2 SERPL-SCNC: 22 MMOL/L (ref 21–32)
CREAT SERPL-MCNC: 1.59 MG/DL (ref 0.6–1)
GLUCOSE SERPL-MCNC: 85 MG/DL (ref 65–100)
POTASSIUM SERPL-SCNC: 4.9 MMOL/L (ref 3.5–5.1)
SODIUM SERPL-SCNC: 143 MMOL/L (ref 136–145)

## 2019-06-27 PROCEDURE — 74011250637 HC RX REV CODE- 250/637: Performed by: FAMILY MEDICINE

## 2019-06-27 PROCEDURE — 36415 COLL VENOUS BLD VENIPUNCTURE: CPT

## 2019-06-27 PROCEDURE — 74011250637 HC RX REV CODE- 250/637: Performed by: INTERNAL MEDICINE

## 2019-06-27 PROCEDURE — 74011250636 HC RX REV CODE- 250/636: Performed by: FAMILY MEDICINE

## 2019-06-27 PROCEDURE — 80048 BASIC METABOLIC PNL TOTAL CA: CPT

## 2019-06-27 RX ADMIN — HYDROCODONE BITARTRATE AND ACETAMINOPHEN 1 TABLET: 5; 325 TABLET ORAL at 08:41

## 2019-06-27 RX ADMIN — AMLODIPINE BESYLATE 2.5 MG: 5 TABLET ORAL at 08:36

## 2019-06-27 RX ADMIN — SODIUM CHLORIDE 75 ML/HR: 900 INJECTION, SOLUTION INTRAVENOUS at 00:17

## 2019-06-27 RX ADMIN — PATIROMER 8.4 G: 8.4 POWDER, FOR SUSPENSION ORAL at 08:36

## 2019-06-27 RX ADMIN — BUPROPION HYDROCHLORIDE 150 MG: 150 TABLET, EXTENDED RELEASE ORAL at 08:36

## 2019-06-27 RX ADMIN — CITALOPRAM HYDROBROMIDE 40 MG: 20 TABLET ORAL at 08:36

## 2019-06-27 NOTE — PROGRESS NOTES
Bedside and Verbal shift change report given to Hedy Desir RN (oncoming nurse) by self Flory Hines nurse). Report included the following information SBAR, Kardex, MAR and Recent Results.

## 2019-06-27 NOTE — PROGRESS NOTES
Bedside and Verbal shift change report given to self (oncoming nurse) by Francisco Murillo (offgoing nurse). Report included the following information SBAR, Kardex, Intake/Output and MAR.

## 2019-06-27 NOTE — DISCHARGE SUMMARY
Hospitalist Discharge Summary Admit Date:  2019 11:47 AM  
Name:  Yesenia Wilson Age:  80 y.o. 
:  1932 MRN:  [id number] PCP:  Maggy Moon MD 
Treatment Team: Attending Provider: Santiago Clark MD; Consulting Provider: Anju Altamirano MD; Utilization Review: Socorro Patricio RN; Care Manager: Adeola Sabillon RN 
 
Problem List for this Hospitalization: 
Hospital Problems as of 2019 Date Reviewed: 6/10/2019 Codes Class Noted - Resolved POA * (Principal) Hyperkalemia ICD-10-CM: E87.5 ICD-9-CM: 276.7  2019 - Present Unknown Acute kidney injury superimposed on CKD ([facility] [address] ICD-10-CM: N17.9, N18.9 ICD-9-CM: 866.00, 585.9  2019 - Present Unknown Chronic pain ICD-10-CM: G89.29 ICD-9-CM: 338.29  2019 - Present Unknown CAD (coronary artery disease) ICD-10-CM: I25.10 ICD-9-CM: 414.00  3/7/2019 - Present Yes Overview Signed 3/7/2019  2:09 PM by Raimundo Oleary MD  
  valve replacement in 2006 (patient unsure which valve and not noted in Dr Antoine Campos note Anxiety ICD-10-CM: F41.9 ICD-9-CM: 300.00  2018 - Present Yes  
   
 H/O tricuspid valve replacement ICD-10-CM: Z95.2 ICD-9-CM: V43.3  2016 - Present Yes Overview Signed 2016  2:09 PM by Raimundo Oleary MD  
  [facility] current use of anticoagulant therapy ICD-10-CM: Z79.01 
ICD-9-CM: V58.61  2015 - Present Yes Essential hypertension, benign ICD-10-CM: I10 
ICD-9-CM: 401.1  Unknown - Present Yes Hyperlipidemia ICD-10-CM: E78.5 ICD-9-CM: 272.4  Unknown - Present Yes Polymyalgia rheumatica (HCC) ICD-10-CM: M35.3 ICD-9-CM: 475  Unknown - Present Yes Esophageal reflux ICD-10-CM: K21.9 ICD-9-CM: 530.81  Unknown - Present Yes Depressive disorder ICD-10-CM: F32.9 ICD-9-CM: 311  Unknown - Present Yes Admission HPI from 2019:   
 80 yr old female pt with diastolic chf, tricuspid valve replacement, h/o afib on xarelto,s/p pacemaker,htn,ckd 3-4, polymyalgia rheumatica, chronic pain, anxiety disorder, uses hearing aids. Mild chronic leg swelling. 
  
Pt was seen at pcp office yesterday for headache, nausea, decreased appetite. Pt had esophageal dilatation done on 6/14/19 for dysphagia. Labs were ordered- pt was called to go to er for elevated k of 6.8. 
  
Apart from pt c/o mild nausea,decreased appetite - pt otherwise doesn't c/o headache or chest pain or sob or abdominal pain or palpitation. 
  
ekg- paced rhythm 
k 6,creat 2.68, gfr 18,bun 55. 
  
Pt is being admitted for hyperkalemia and acute on ckd Hospital Course: Pt was continued on ivf. Nephrology was consulted- started on veltassa. pt in er got sodium bicarb, kayexalate and albuterol treatments. pt kidney functions improved. Hyperkalemia resolved. pts spironolactone and losartan had been held since admission and d/daniela at discharge. Advised to continue only lasix for now. Pt is stable for discharge. Follow up instructions below. Plan was discussed with pt,grandson and sister at bedside. All questions answered. Patient was stable at time of discharge and was instructed to call or return if there are any concerns or recurrence of symptoms. Diagnostic Imaging/Tests: No results found. Echocardiogram results: No results found for this visit on 06/25/19. All Micro Results None Labs: Results:  
   
BMP, Mg, Phos Recent Labs  
  06/27/19 
0610 06/26/19 
0456 06/25/19 
1139 06/24/19 
1519  141 138 139  
K 4.9 5.5* 6.0* 6.8*  
* 111* 109* 107* CO2 22 23 23 19* AGAP 7 7 6*  --   
BUN 35* 53* 55* 56* CREA 1.59* 2.32* 2.68* 2.68* CA 9.5 9.4 9.6 9.8 GLU 85 85 103* 79 MG  --   --   --  2.1 CBC Recent Labs  
  06/25/19 
1139 06/24/19 
1519 WBC 4.6 4.9  
RBC 3.54* 3.66* HGB 10.8* 10.9* HCT 33.7* 34.3  176 GRANS 61 55 LYMPH 25  --   
EOS 4 4 MONOS 8 10 BASOS 1 1 IG 1 0 ANEU 2.8 2.7 ABL 1.1  --   
BELLE 0.2 0.2 ABM 0.4 0.5 ABB 0.1 0.0 AIG 0.0 0.0 LFT Recent Labs  
  06/25/19 
1139 06/24/19 
1519 SGOT 19 25 ALT 19 11 AP 93 86  
TP 7.7 7.5 ALB 3.8 4.3 GLOB 3.9*  --   
AGRAT 1.0* 1.3 Cardiac Testing Lab Results Component Value Date/Time  (H) 06/25/2019 11:39 AM  
  12/16/2015 02:36 PM  
 Troponin-I, Qt. <0.02 (L) 12/16/2015 02:36 PM  
  
Coagulation Tests Lab Results Component Value Date/Time Prothrombin time 22.1 (H) 12/16/2015 02:36 PM  
 INR 2.0 (H) 12/16/2015 02:36 PM  
 INR, External 2.0 07/24/2015 INR, External 1.7 06/22/2015 02:54 PM  
 INR POC 1.2 08/24/2016 02:37 PM  
 INR POC 1.5 08/10/2016 02:34 PM  
 INR POC 1.2 07/27/2016 02:57 PM  
 aPTT 44.1 (H) 12/16/2015 02:36 PM  
  
A1c No results found for: HBA1C, HGBE8, GBI3ZXDL Lipid Panel No results found for: CHOL, CHOLPOCT, CHOLX, CHLST, CHOLV, 131488, HDL, LDL, LDLC, DLDLP, 839861, VLDLC, VLDL, TGLX, TRIGL, TRIGP, TGLPOCT, CHHD, CHHDX Thyroid Panel Lab Results Component Value Date/Time TSH 1.530 04/11/2019 03:44 PM  
 TSH 1.860 07/12/2017 02:14 PM  
    
Most Recent UA Lab Results Component Value Date/Time Color YELLOW 06/26/2019 05:30 AM  
 Appearance CLEAR 06/26/2019 05:30 AM  
 Specific gravity 1.009 06/26/2019 05:30 AM  
 pH (UA) 5.5 06/26/2019 05:30 AM  
 Protein NEGATIVE  06/26/2019 05:30 AM  
 Glucose NEGATIVE  06/26/2019 05:30 AM  
 Ketone NEGATIVE  06/26/2019 05:30 AM  
 Bilirubin NEGATIVE  06/26/2019 05:30 AM  
 Blood NEGATIVE  06/26/2019 05:30 AM  
 Urobilinogen 0.2 06/26/2019 05:30 AM  
 Nitrites NEGATIVE  06/26/2019 05:30 AM  
 Leukocyte Esterase TRACE (A) 06/26/2019 05:30 AM  
  
 
Allergies Allergen Reactions  Prednisolone Not Reported This Time S/s: heart racing per family member  Prednisone Unknown (comments)  Protuss [Hydrocodone-Potassium Guaiaco] Other (comments) Sleepless, jittery Immunization History Administered Date(s) Administered  Influenza High Dose Vaccine PF 09/07/2016, 11/14/2017, 10/17/2018  Influenza Vaccine 01/23/2001, 12/28/2004, 09/27/2013, 10/16/2014, 11/18/2015  Pneumococcal Conjugate (PCV-13) 02/27/2015  Pneumococcal Polysaccharide (PPSV-23) 10/11/1999, 10/31/2008  TB Skin Test (PPD) Intradermal 06/25/2019  Td 02/28/1998, 09/28/1998  Tdap 09/05/2018 All Labs from Last 24 Hrs: 
Recent Results (from the past 24 hour(s)) PLEASE READ & DOCUMENT PPD TEST IN 24 HRS Collection Time: 06/26/19  5:11 PM  
Result Value Ref Range PPD Negative Negative  
 mm Induration 0 0 - 5 mm METABOLIC PANEL, BASIC Collection Time: 06/27/19  6:10 AM  
Result Value Ref Range Sodium 143 136 - 145 mmol/L Potassium 4.9 3.5 - 5.1 mmol/L Chloride 114 (H) 98 - 107 mmol/L  
 CO2 22 21 - 32 mmol/L Anion gap 7 7 - 16 mmol/L Glucose 85 65 - 100 mg/dL BUN 35 (H) 8 - 23 MG/DL Creatinine 1.59 (H) 0.6 - 1.0 MG/DL  
 GFR est AA 40 (L) >60 ml/min/1.73m2 GFR est non-AA 33 (L) >60 ml/min/1.73m2 Calcium 9.5 8.3 - 10.4 MG/DL Discharge Exam: 
Patient Vitals for the past 24 hrs: 
 Temp Pulse Resp BP SpO2  
06/27/19 0836  67  129/71   
06/27/19 0451 98.5 °F (36.9 °C) 77 18 133/67 94 % 06/27/19 0032 98.6 °F (37 °C) 63 18 136/62 95 % 06/26/19 2105 98 °F (36.7 °C) 63 18 171/67 97 % 06/26/19 1822 96.8 °F (36 °C) 71 20 146/60 94 % 06/26/19 1426 98.8 °F (37.1 °C) 63 20 127/59 98 % Oxygen Therapy O2 Sat (%): 94 % (06/27/19 0451) Pulse via Oximetry: 84 beats per minute (06/25/19 1429) O2 Device: Room air (06/27/19 6416) Intake/Output Summary (Last 24 hours) at 6/27/2019 1233 Last data filed at 6/27/2019 4229 Gross per 24 hour Intake 420 ml Output 2050 ml Net -1630 ml General:    Well nourished. Alert. No distress. Eyes:   Normal sclera. Extraocular movements intact. ENT:  Normocephalic, atraumatic. Moist mucous membranes CV:   Regular rate and rhythm. No murmur, rub, or gallop. Lungs:  Clear to auscultation bilaterally. No wheezing, rhonchi, or rales. Abdomen: Soft, nontender, nondistended. Bowel sounds normal.  
Extremities: Warm and dry. No cyanosis or edema. Neurologic: CN II-XII grossly intact. Sensation intact. Skin:     No rashes or jaundice. Psych:  Normal mood and affect. Discharge Info:  
Current Discharge Medication List  
  
CONTINUE these medications which have NOT CHANGED Details  
fluticasone propionate (FLONASE) 50 mcg/actuation nasal spray 2 Sprays by Both Nostrils route daily. Qty: 1 Bottle, Refills: 3 Associated Diagnoses: Chronic nonintractable headache, unspecified headache type LORazepam (ATIVAN) 1 mg tablet TAKE ONE TABLET BY MOUTH TWICE DAILY AS NEEDED FOR ANXIETY  Indications: anxious Qty: 60 Tab, Refills: 3 Comments: Fax # [phone] Associated Diagnoses: Anxiety  
  
amLODIPine (NORVASC) 2.5 mg tablet Take 1 Tab by mouth daily. Qty: 90 Tab, Refills: 3 Associated Diagnoses: Diastolic CHF, chronic ([address] Localized edema HYDROcodone-acetaminophen (NORCO) 5-325 mg per tablet Take 1 Tab by mouth every eight (8) hours as needed for Pain for up to 30 days. Max Daily Amount: 3 Tabs. 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 Qty: 30 Tab, Refills: 11  
 Associated Diagnoses: Localized edema  
  
citalopram (CELEXA) 40 mg tablet Take 1 tablet by mouth daily. Qty: 30 Tab, Refills: 11  
 Associated Diagnoses: Depression, unspecified depression type  
  
pantoprazole (PROTONIX) 40 mg tablet Take 1 Tab by mouth daily. Qty: 30 Tab, Refills: 5 Associated Diagnoses: Gastroesophageal reflux disease without esophagitis buPROPion XL (WELLBUTRIN XL) 150 mg tablet Take 1 Tab by mouth every morning. Qty: 30 Tab, Refills: 5  
  
rivaroxaban (XARELTO) 20 mg tab tablet Take 1 Tab by mouth daily. Qty: 30 Tab, Refills: 11  
 Associated Diagnoses: Chronic atrial fibrillation (HCC)  
  
nitroglycerin (NITROSTAT) 0.4 mg SL tablet by SubLINGual route every five (5) minutes as needed for Chest Pain. STOP taking these medications  
  
 ondansetron (ZOFRAN ODT) 4 mg disintegrating tablet Comments:  
Reason for Stopping:   
   
 nystatin (MYCOSTATIN) topical cream Comments:  
Reason for Stopping:   
   
 spironolactone (ALDACTONE) 25 mg tablet Comments:  
Reason for Stopping:   
   
 diclofenac (VOLTAREN) 1 % gel Comments:  
Reason for Stopping:   
   
 olmesartan (BENICAR) 20 mg tablet Comments:  
Reason for Stopping:   
   
  
 
 
 
Disposition: home Activity: As tolerated- walks with walker at home. Diet: DIET RENAL Regular Follow-up Appointments Procedures  FOLLOW UP VISIT Appointment in: One Week pcp in a week with bmp. F/u with nephrology in 2 weeks. F/u with cardiology on 2-3 weeks. Daily weights. pcp in a week with bmp. F/u with nephrology in 2 weeks. F/u with cardiology on 2-3 weeks. Daily weights. Standing Status:   Standing Number of Occurrences:   1 Order Specific Question:   Appointment in Answer: One Week Follow-up Information Follow up With Specialties Details Why Contact Info Kash Monsalve MD Internal Medicine   [address] [facility] [address] [phone] Time spent in patient discharge planning and coordination 25 minutes.  
 
Signed: 
Leah Menendez MD

## 2019-06-27 NOTE — PROGRESS NOTES
Care Management Interventions PCP Verified by CM: Yes(last seen Monday 6/24/19) Palliative Care Criteria Met (RRAT>21 & CHF Dx)?: No(RRAT 24 Dx Hyperkalemia, JJ ) Mode of Transport at Discharge: Other (see comment)(daughter) Transition of Care Consult (CM Consult): Discharge Planning Discharge Durable Medical Equipment: No(walker, wheelchair, shower chair, and grab bars) Physical Therapy Consult: No 
Occupational Therapy Consult: No 
Speech Therapy Consult: No 
Current Support Network: Lives Alone(lives alone however family stays with her around the clock most of the time) Confirm Follow Up Transport: Family Plan discussed with Pt/Family/Caregiver: Yes Freedom of Choice Offered: Yes Discharge Location Discharge Placement: Home with home health Patient ready for d/c today. Per daughter Dr. Lisa Holguin has ordered home health and they cannot remember the name so they will follow up when they get home as they have the information to who home health is and contact Dr. Lisa Holguin office if have any questions. They states he had ordered it prior to her coming to hospital but they have not started services or seen her yet. They are in agreement with d/c today. Patient  
to d/c home with family and follow up with Dr. Lisa Holguin about home health. CM noted in connect note from Butch Linares NP on 6/24/19 that they sent referral to home health but does not indicate who received referral. Family states they will follow up with physician at d/c.

## 2019-06-27 NOTE — DISCHARGE INSTRUCTIONS
Patient Education        Learning About Your Kidneys  What do your kidneys do? Your kidneys are two bean-shaped organs. Each one is about the size of your fist. They are part of your internal organs. They are located in the back on either side of your spine. Your kidneys are protected by your ribs. The kidneys do three main things in your body:  · Remove wastes. They filter waste products and extra fluid out of your blood. These wastes leave your body through your urine. · Balance the fluids and chemicals in your body. The kidneys keep the right balance of fluids and chemicals your body needs. · Produce hormones. They make hormones that help your body make red blood cells. Hormones also manage your blood pressure, build healthy bones, and keep your muscles working as they should. What problems can happen to your kidneys? High blood pressure and diabetes can lead to kidney problems. These include kidney infections and chronic kidney disease. Chronic kidney disease means that for some time your kidneys have not been working the way they should. Some medicines can also lead to kidney damage. Other kidney problems include kidney stones and kidney cancer. How can you prevent kidney problems? Many kidney problems come from other conditions. These include high blood pressure and diabetes. If you take steps to manage these, your kidneys can be healthier. A healthy lifestyle may help prevent kidney problems:  · Stay at a healthy weight. · Talk to your doctor about getting more exercise. · Avoid or limit alcohol and salt. · Eat plenty of fruits, vegetables, legumes, whole grains, and low-fat dairy products. · Drink plenty of fluids, enough so that your urine is light yellow or clear like water. · Lower the amount of saturated fat in your diet. · Don't smoke. Smoking can make these conditions worse. If you need help quitting, talk to your doctor about stop-smoking programs and medicines.  These can increase your chances of quitting for good. Doing these things may also lower your risk for kidney cancer. Where can you learn more? Go to http://peyman-rinku.info/. Enter W213 in the search box to learn more about \"Learning About Your Kidneys. \"  Current as of: March 14, 2018  Content Version: 11.9  © 5856-5756 [employer]. Care instructions adapted under license by [employer] (which disclaims liability or warranty for this information). If you have questions about a medical condition or this instruction, always ask your healthcare professional. [place] [address] any warranty or liability for your use of this information. DISCHARGE SUMMARY from Nurse    PATIENT INSTRUCTIONS:    After general anesthesia or intravenous sedation, for 24 hours or while taking prescription Narcotics:  · Limit your activities  · Do not drive and operate hazardous machinery  · Do not make important personal or business decisions  · Do  not drink alcoholic beverages  · If you have not urinated within 8 hours after discharge, please contact your surgeon on call. Report the following to your surgeon:  · Excessive pain, swelling, redness or odor of or around the surgical area  · Temperature over 100.5  · Nausea and vomiting lasting longer than 4 hours or if unable to take medications  · Any signs of decreased circulation or nerve impairment to extremity: change in color, persistent  numbness, tingling, coldness or increase pain  · Any questions    What to do at Home:  Recommended activity: Activity as tolerated,     If you experience any of the following symptoms difficulty urinating or chest pain, please follow up with your doctor. *  Please give a list of your current medications to your Primary Care Provider.     *  Please update this list whenever your medications are discontinued, doses are      changed, or new medications (including over-the-counter products) are added.    *  Please carry medication information at all times in case of emergency situations. These are general instructions for a healthy lifestyle:    No smoking/ No tobacco products/ Avoid exposure to second hand smoke  Surgeon General's Warning:  Quitting smoking now greatly reduces serious risk to your health. Obesity, smoking, and sedentary lifestyle greatly increases your risk for illness    A healthy diet, regular physical exercise & weight monitoring are important for maintaining a healthy lifestyle    You may be retaining fluid if you have a history of heart failure or if you experience any of the following symptoms:  Weight gain of 3 pounds or more overnight or 5 pounds in a week, increased swelling in our hands or feet or shortness of breath while lying flat in bed. Please call your doctor as soon as you notice any of these symptoms; do not wait until your next office visit. The discharge information has been reviewed with the caregiver. The caregiver verbalized understanding. Discharge medications reviewed with the caregiver and appropriate educational materials and side effects teaching were provided.   ___________________________________________________________________________________________________________________________________

## 2019-06-28 ENCOUNTER — PATIENT OUTREACH (OUTPATIENT)
Dept: CASE MANAGEMENT | Age: 84
End: 2019-06-28

## 2019-06-28 NOTE — PROGRESS NOTES
This note will not be viewable in [address] 1st Attempt to contact patient for PEYTON call, no answer, left  for returned call. Will attempt to contact patient again within 24 hours

## 2019-06-30 NOTE — ED PROVIDER NOTES
Elderly patient presents in no distress with hyperkalemia, She had routine bloodwork drawn yesterday, and this am was called with a K+ of 6.8, and told to come to the e.r. No h/o renal insufficiency Pt in good spirits, voices no specific c/o, No recent ct dye or nsaids Past Medical History:  
Diagnosis Date  Abnormal coagulation profile  Anxiety 1/12/2018  Arthritis  Atrial fibrillation ([facility] [address] 11/15/2012  
 and SSS and has [facility] v[facility] pacer  Benign neoplasm of colon  CAD (coronary artery disease)   
 valve replacement in 2006 (patient unsure which valve and not noted in Dr Truong Alamo note  Depressive disorder, not elsewhere classified  Edema  Encounter for long-term (current) use of other medications  Esophageal reflux  Essential hypertension, benign  GERD (gastroesophageal reflux disease)   
 controlled with omperazole  Gout, unspecified   
 patient and family denies  Hearing loss  Hypertension  Insomnia, unspecified  Loss of height  Lumbago  Nervousness(799.21)  Osteoarthrosis involving, or with mention of more than one site, but not specified as generalized, multiple sites  Osteopenia  Other and unspecified hyperlipidemia  Other dyspnea and respiratory abnormality  Other malaise and fatigue  Pain in limb  Polymyalgia rheumatica ([facility] [address]  Premature menopause 4/10/2015  Renal insufficiency   
 patient and family denies, last creatine was 0.9 12/2015  Unspecified deficiency anemia  Unspecified fall  Unspecified hereditary and idiopathic peripheral neuropathy  Urinary frequency  Urinary tract infection, site not specified Past Surgical History:  
Procedure Laterality Date  HX APPENDECTOMY  age 25  
 HX CHOLECYSTECTOMY  [address] VALVE SURGERY  2006 Replacement, family and patient not sure which valve, note noted in cardiology note  HX HYSTERECTOMY  HX KNEE REPLACEMENT Left 12/15/2011  HX KNEE REPLACEMENT Right 10/05/2005 Dr Kiley Zelaya  HX LUMBAR LAMINECTOMY  HX LUMBAR LAMINECTOMY  HX PACEMAKER  11/15/12 Dual chamber, non rate responsive (implantable) st vinicio Family History:  
Problem Relation Age of Onset  Pacemaker Mother  Heart Disease Mother  Cancer Father  Pacemaker Brother  Heart Disease Brother  Diabetes Sister  Diabetes Sister Social History Socioeconomic History  Marital status:  Spouse name: Not on file  Number of children: Not on file  Years of education: Not on file  Highest education level: Not on file Occupational History  Not on file Social Needs  Financial resource strain: Not on file  Food insecurity:  
  Worry: Not on file Inability: Not on file  Transportation needs:  
  Medical: Not on file Non-medical: Not on file Tobacco Use  Smoking status: Never Smoker  Smokeless tobacco: Never Used Substance and Sexual Activity  Alcohol use: No  
  Alcohol/week: 0.0 oz  Drug use: No  
 Sexual activity: Not on file Lifestyle  Physical activity:  
  Days per week: Not on file Minutes per session: Not on file  Stress: Not on file Relationships  Social connections:  
  Talks on phone: Not on file Gets together: Not on file Attends Taoism service: Not on file Active member of club or organization: Not on file Attends meetings of clubs or organizations: Not on file Relationship status: Not on file  Intimate partner violence:  
  Fear of current or ex partner: Not on file Emotionally abused: Not on file Physically abused: Not on file Forced sexual activity: Not on file Other Topics Concern  Not on file Social History Narrative  Not on file ALLERGIES: Prednisolone; Prednisone; and Protuss [hydrocodone-potassium guaiaco] Review of Systems Constitutional: Negative for chills and fever. HENT: Negative for rhinorrhea and sore throat. Eyes: Negative for discharge and redness. Respiratory: Negative for cough and shortness of breath. Cardiovascular: Negative for chest pain and palpitations. Gastrointestinal: Negative for abdominal pain, diarrhea, nausea and vomiting. Musculoskeletal: Negative for arthralgias and back pain. Skin: Negative for rash. Neurological: Negative for dizziness and headaches. All other systems reviewed and are negative. Vitals:  
 06/26/19 2105 06/27/19 0032 06/27/19 0451 06/27/19 0056 BP: 171/67 136/62 133/67 129/71 Pulse: 63 63 77 67 Resp: 18 18 18 18 Temp: 98 °F (36.7 °C) 98.6 °F (37 °C) 98.5 °F (36.9 °C) 98 °F (36.7 °C) SpO2: 97% 95% 94% 95% Weight:   79.1 kg (174 lb 6.4 oz) Height:      
      
 
Physical Exam  
Constitutional: She appears well-developed and well-nourished. No distress. HENT:  
Head: Normocephalic and atraumatic. Eyes: Pupils are equal, round, and reactive to light. Conjunctivae are normal. Right eye exhibits no discharge. Left eye exhibits no discharge. No scleral icterus. Neck: Normal range of motion. Neck supple. Cardiovascular: Normal rate, regular rhythm and normal heart sounds. Pulmonary/Chest: Effort normal and breath sounds normal. No respiratory distress. She has no wheezes. She has no rales. Abdominal: Soft. There is no tenderness. There is no guarding. Musculoskeletal: Normal range of motion. She exhibits no edema. Neurological: She is alert. She exhibits normal muscle tone. cni 2-12 grossly Conversational but not sure why she is here Skin: Skin is warm and dry. She is not diaphoretic. Psychiatric: She has a normal mood and affect. Her behavior is normal.  
Nursing note and vitals reviewed. MDM Number of Diagnoses or Management Options Acute hyperkalemia:  
Acute on chronic renal insufficiency: Chronic congestive heart failure, unspecified heart failure type [facility]):  
Diagnosis management comments: Medical decision making note: Hyperkalemia of 6.8 yesterday, and 6.0 today Mild cki Multiple treatment modalities to lower the K+ have actually led to a rise up to 6.2 on a venous blood gas with electrolytes 
admit This concludes the \"medical decision making note\" part of this emergency department visit note. Procedures

## 2019-07-01 ENCOUNTER — PATIENT OUTREACH (OUTPATIENT)
Dept: CASE MANAGEMENT | Age: 84
End: 2019-07-01

## 2019-07-01 NOTE — PROGRESS NOTES
This note will not be viewable in [address] 2nd attempt to contact patient for GRADY BarneveldS call, no answer, left VM on daughter and granddaughters phones for returned call. Will attempt 3rd call within 5 business days.

## 2019-07-02 ENCOUNTER — PATIENT OUTREACH (OUTPATIENT)
Dept: CASE MANAGEMENT | Age: 84
End: 2019-07-02

## 2019-07-02 NOTE — PROGRESS NOTES
This note will not be viewable in [address] Date/Time of Call: 07/02/19 119pm  
What was the patient hospitalized for? Hyperkalemia Consent for MIGUEL A DOTSON Call Does the patient understand his/her diagnosis and/or treatment and what happened during the hospitalization? Called and spoke with patients granddaughter Susy. She agrees to call, and states that the patient is doing great. Yes Did the patient receive discharge instructions? Yes  
CM Assessed Risk for Readmission:  
 
 
 
Patient stated Risk for Readmission:  Patient is a moderate to high risk for readmission due to diagnoses and/ or comorbidities No concerns are voiced at this time Review any discharge instructions (see discharge instructions/AVS in [facility]). Ask patient if they understand these. Do they have any questions? reviewed DC instructions Were home services ordered (nursing, PT, OT, ST, etc.)? Yes If so, has the first visit occurred? If not, why? (Assist with coordination of services if necessary.) Start of care scheduled for Friday 07/05/19 Was any DME ordered? No   
If so, has it been received? If not, why?  (Assist patient in obtaining DME orders &/or equipment if necessary. ) NA Complete a review of all medications (new, continued and discontinued meds per the D/C instructions and medication tab in [facility]). Medications reviewed STOP taking: 
diclofenac 1 % Gel (VOLTAREN) 
nystatin topical cream (MYCOSTATIN) olmesartan 20 mg tablet (BENICAR) 
ondansetron 4 mg disintegrating tablet (ZOFRAN ODT) 
spironolactone 25 mg tablet (ALDACTONE) Were all new prescriptions filled? If not, why?  (Assist patient in obtaining medications if necessary  escalate for CCM &/or SW if ongoing issues are verbalized by pt or anticipated) NA Does the patient understand the purpose and dosing instructions for all medications? (If patient has questions, provide explanation and education.) Susy verbalizes understanding of medications Does the patient have any problems in performing ADLs? (If patient is unable to perform ADLs  what is the limiting factor(s)? Do they have a support system that can assist? If no support system is present, discuss possible assistance that they may be able to obtain. Escalate for CCM/SW if ongoing issues are verbalized by pt or anticipated) Patients family assists with ADLs Does the patient have all follow-up appointments scheduled? 7 day f/up with PCP?  
(f/up with PCP may be w/in 14 days if patient has a f/up with their specialist w/in 7 days) 7-14 day f/up with specialist?  
(or per discharge instructions) If f/up has not been made  what actions has the care coordinator made to accomplish this? Has transportation been arranged? yes  
 
 
07/02/19 at 345pm 
 
 
 
Cardiology 07/01/19 at 945am 
 
 
Reviewed appointment information with [facility] Yes, no concerns Any other questions or concerns expressed by the patient? No questions or concerns are voiced at this time. Care Coordinator contact information provided should any needs arise Schedule next appointment with MIGUEL A Lim or refer to RN Case Manager/ per the workflow guidelines. When is care coordinators next follow-up call scheduled? If referred for CCM  what RN care manager was the referral assigned? Within 30 days Within 30 days NA  
PEYTON Call Completed By: Wu Proctor CMA Care Coordinator

## 2019-07-26 PROBLEM — N18.30 CKD (CHRONIC KIDNEY DISEASE) STAGE 3, GFR 30-59 ML/MIN (HCC): Status: ACTIVE | Noted: 2019-07-26

## 2019-07-31 ENCOUNTER — PATIENT OUTREACH (OUTPATIENT)
Dept: CASE MANAGEMENT | Age: 84
End: 2019-07-31

## 2019-07-31 NOTE — PROGRESS NOTES
This note will not be viewable in [address] Transitions of Care  Follow up Outreach Note Outreach type Phone call: Spoke with patients Granddaughter Susy Date/Time of Outreach: 07/31/19 @ 248pm  
 
Has patient attended PCP or specialist follow-up appointments since last contact? What was outcome of appointment? When is next follow-up scheduled? She states that she is doing okay. She has completed FU with PCP and Cardiology with FUs scheduled appropriately Review medications. Any medication changes since last outreach? Does patient have any questions or issues related to their medications? Patient has medications. No significant changes. No questions or concerns Home health active? If yes  any issue? Progress? HH active Referrals needed? 
(CM, SW, HH, etc.) 
 NA Other issues/Miscellaneous? (Transportation, access to meals, ability to perform ADLs, adequate caregiver support, etc.) Patients granddaughter denies any questions or concerns at this time. Care Coordinator contact information provided should needs arise Next Outreach Scheduled? Graduation from program? 
 NA 
  
yes Next Steps/Goals (if applicable): 
 NA Outreach completed by: 
 Jerrald Boxer, CMA Community Care Coordinator

## 2019-09-05 NOTE — ED NOTES
I have reviewed discharge instructions with the caregiver. The caregiver verbalized understanding. Patient left ED via Discharge Method: stretcher to Home with Tommie Crowe. Opportunity for questions and clarification provided. Patient given 1 scripts. To continue your aftercare when you leave the hospital, you may receive an automated call from our care team to check in on how you are doing. This is a free service and part of our promise to provide the best care and service to meet your aftercare needs.  If you have questions, or wish to unsubscribe from this service please call [phone]. Thank you for Choosing our [address] [place] [address] Emergency Department.

## 2019-09-05 NOTE — ED NOTES
Patient family concerned patient not at baseline, and not being able to safely care for patient at home. Attending MD notified of patients family concerns. MD reviewed labs and test results. Social work contacted to speak with family.

## 2019-09-05 NOTE — DISCHARGE INSTRUCTIONS
Patient Education        Gastritis: Care Instructions  Your Care Instructions    Gastritis is a sore and upset stomach. It happens when something irritates the stomach lining. Many things can cause it. These include an infection such as the flu or something you ate or drank. Medicines or a sore on the lining of the stomach (ulcer) also can cause it. Your belly may bloat and ache. You may belch, vomit, and feel sick to your stomach. You should be able to relieve the problem by taking medicine. And it may help to change your diet. If gastritis lasts, your doctor may prescribe medicine. Follow-up care is a key part of your treatment and safety. Be sure to make and go to all appointments, and call your doctor if you are having problems. It's also a good idea to know your test results and keep a list of the medicines you take. How can you care for yourself at home? · If your doctor prescribed antibiotics, take them as directed. Do not stop taking them just because you feel better. You need to take the full course of antibiotics. · Be safe with medicines. If your doctor prescribed medicine to decrease stomach acid, take it as directed. Call your doctor if you think you are having a problem with your medicine. · Do not take any other medicine, including over-the-counter pain relievers, without talking to your doctor first.  · If your doctor recommends over-the-counter medicine to reduce stomach acid, such as Pepcid AC, Prilosec, Tagamet HB, or Zantac 75, follow the directions on the label. · Drink plenty of fluids (enough so that your urine is light yellow or clear like water) to prevent dehydration. Choose water and other caffeine-free clear liquids. If you have kidney, heart, or liver disease and have to limit fluids, talk with your doctor before you increase the amount of fluids you drink. · Limit how much alcohol you drink. · Avoid coffee, tea, cola drinks, chocolate, and other foods with caffeine.  They increase stomach acid. When should you call for help? Call 911 anytime you think you may need emergency care. For example, call if:    · You vomit blood or what looks like coffee grounds.     · You pass maroon or very bloody stools.    Call your doctor now or seek immediate medical care if:    · You start breathing fast and have not produced urine in the last 8 hours.     · You cannot keep fluids down.    Watch closely for changes in your health, and be sure to contact your doctor if:    · You do not get better as expected. Where can you learn more? Go to http://peyman-rinku.info/. Enter [id number] in the search box to learn more about \"Gastritis: Care Instructions. \"  Current as of: November 7, 2018  Content Version: 12.1  © 0224-1070 [employer]. Care instructions adapted under license by [employer] (which disclaims liability or warranty for this information). If you have questions about a medical condition or this instruction, always ask your healthcare professional. [address] any warranty or liability for your use of this information.

## 2019-09-05 NOTE — ED TRIAGE NOTES
Patient from home via EMS for AMS and vomiting. EMS states that patient is not altered but extremely hard of hearing and reads lips. Family does think that she is more confused then normal Patient alert to self and situation. Patient has had ongoing problems with with n/v/d, burping, dizziness and headaches x 6 months. Patients daughter at bedside states that she had her esophagus stretched a \"few months ago and she is acting like she was right before that. \" Patient has not  vomited today but has reported nausea and diarrhea. Patient has not had any of her medications r/t nausea. EMS states patient was hypertensive on arrival with . RACE score 0. 22 gauge in the left AC.

## 2019-09-05 NOTE — PROGRESS NOTES
LMSW asked to see pt/family about resources as she is to be discharged back to her home today with her family. Per  Daughter pt is active with HH (did not name provider) and family have been caring for pt. They feel she has had a decline and are waiting to talk to the MD about pt condition. Plan is for pt to return home with sepidehclarisa as she does not meet criteria for admission at this time per MD.  Educated pt and family about medicaid CLTC services and encouraged them to apply. Also provided contact for [facility] rep Kerri Barillas to help family with any community resources and private care options that may be available to them. MD is to see pt/chanda to answer medical questions prior to pt discharge.

## 2019-09-05 NOTE — ED PROVIDER NOTES
Patient with 3 to 6-month history of nausea vomiting diarrhea and burping after eating. Around 2 months ago family reports patient had esophageal dilation with improvement in symptoms. 3 weeks ago symptoms returned. Has mild diffuse abdominal pain. Family also reports some confusion. Asking where family members are and not knowing exactly where she is. She was seen by GI on Tuesday and by her hearing aid doctor on Wednesday. Has been more fatigued since both of those visits. The history is provided by the patient. No  was used. Abdominal Pain    This is a new problem. Episode onset: months. The problem occurs daily. The problem has not changed since onset. The pain is associated with an unknown factor. The pain is located in the generalized abdominal region. The quality of the pain is aching. The pain is mild. Associated symptoms include diarrhea, nausea and vomiting. Pertinent negatives include no fever, no melena, no constipation, no dysuria, no hematuria, no headaches, no chest pain and no back pain. Nothing worsens the pain. The pain is relieved by nothing. Her past medical history is significant for GERD. The patient's surgical history includes appendectomy, cholecystectomy and hysterectomy.        Past Medical History:   Diagnosis Date    Abnormal coagulation profile     Anxiety 1/12/2018    Arthritis     Atrial fibrillation (HCC) 11/15/2012    and SSS and has [facility] pacer     Benign neoplasm of colon     CAD (coronary artery disease)     valve replacement in 2006 (patient unsure which valve and not noted in Dr Len Singh note    Depressive disorder, not elsewhere classified     Edema     Encounter for long-term (current) use of other medications     Esophageal reflux     Essential hypertension, benign     GERD (gastroesophageal reflux disease)     controlled with omperazole    Gout, unspecified     patient and family denies    Hearing loss     Hypertension     Insomnia, unspecified     Loss of height     Lumbago     Nervousness(799.21)     Osteoarthrosis involving, or with mention of more than one site, but not specified as generalized, multiple sites     Osteopenia     Other and unspecified hyperlipidemia     Other dyspnea and respiratory abnormality     Other malaise and fatigue     Pain in limb     Polymyalgia rheumatica ([facility] [address]     Premature menopause 4/10/2015    Renal insufficiency     patient and family denies, last creatine was 0.9 12/2015    Unspecified deficiency anemia     Unspecified fall     Unspecified hereditary and idiopathic peripheral neuropathy     Urinary frequency     Urinary tract infection, site not specified        Past Surgical History:   Procedure Laterality Date    HX APPENDECTOMY  age 25   Kamran Elaine CHOLECYSTECTOMY  1    HX HEART VALVE SURGERY  2006    Replacement, family and patient not sure which valve, note noted in cardiology note    HX HYSTERECTOMY      HX KNEE REPLACEMENT Left 12/15/2011    HX KNEE REPLACEMENT Right 10/05/2005    Dr Scout Kelsey    HX LUMBAR LAMINECTOMY      HX LUMBAR LAMINECTOMY      HX PACEMAKER  11/15/12    Dual chamber, non rate responsive (implantable) [facility]         Family History:   Problem Relation Age of Onset    Pacemaker Mother     Heart Disease Mother     Cancer Father     Pacemaker Brother     Heart Disease Brother     Diabetes Sister     Diabetes Sister        Social History     Socioeconomic History    Marital status:      Spouse name: Not on file    Number of children: Not on file    Years of education: Not on file    Highest education level: Not on file   Occupational History    Not on file   Social Needs    Financial resource strain: Not on file    Food insecurity:     Worry: Not on file     Inability: Not on file    Transportation needs:     Medical: Not on file     Non-medical: Not on file   Tobacco Use    Smoking status: Never Smoker    Smokeless tobacco: Never Used   Substance and Sexual Activity    Alcohol use: No     Alcohol/week: 0.0 standard drinks    Drug use: No    Sexual activity: Not on file   Lifestyle    Physical activity:     Days per week: Not on file     Minutes per session: Not on file    Stress: Not on file   Relationships    Social connections:     Talks on phone: Not on file     Gets together: Not on file     Attends [employer] service: Not on file     Active member of club or organization: Not on file     Attends meetings of clubs or organizations: Not on file     Relationship status: Not on file    Intimate partner violence:     Fear of current or ex partner: Not on file     Emotionally abused: Not on file     Physically abused: Not on file     Forced sexual activity: Not on file   Other Topics Concern    Not on file   Social History Narrative    Not on file         ALLERGIES: Prednisolone; Prednisone; and Protuss [hydrocodone-potassium guaiaco]    Review of Systems   Constitutional: Positive for fatigue. Negative for chills and fever. HENT: Negative for rhinorrhea and sore throat. Eyes: Negative for pain and redness. Respiratory: Negative for chest tightness, shortness of breath and wheezing. Cardiovascular: Negative for chest pain and leg swelling. Gastrointestinal: Positive for abdominal pain, diarrhea, nausea and vomiting. Negative for constipation and melena. Genitourinary: Negative for dysuria and hematuria. Musculoskeletal: Negative for back pain, gait problem, neck pain and neck stiffness. Skin: Negative for color change and rash. Neurological: Positive for weakness. Negative for numbness and headaches. Psychiatric/Behavioral: Positive for confusion. Vitals:    09/05/19 1126   BP: 167/73   Pulse: 60   Resp: 14   Temp: 98.7 °F (37.1 °C)   SpO2: 92%   Weight: 78 kg (172 lb)   Height: 5' 5\" (1.651 m)            Physical Exam   Constitutional: She appears well-developed and well-nourished.    HENT:   Head: Normocephalic and atraumatic. Eyes: Pupils are equal, round, and reactive to light. EOM are normal.   Neck: Normal range of motion. Neck supple. Cardiovascular: Normal rate and regular rhythm. Murmur heard. Pulmonary/Chest: Effort normal and breath sounds normal. She has no wheezes. Abdominal: Soft. Bowel sounds are normal. There is no tenderness. Musculoskeletal: Normal range of motion. She exhibits edema (mild BLE). Neurological: She is alert. No cranial nerve deficit. Skin: Skin is warm and dry. MDM  Number of Diagnoses or Management Options  Diagnosis management comments: Patient resting comfortably in bed here. Will discharge with medication and follow-up with GI. Amount and/or Complexity of Data Reviewed  Clinical lab tests: ordered and reviewed  Tests in the radiology section of CPT®: ordered and reviewed  Tests in the medicine section of CPT®: ordered and reviewed    Patient Progress  Patient progress: stable         Procedures        EKG: normal sinus rhythm, nonspecific ST and T waves changes, LBBB. Rate 60. Results Include:    Recent Results (from the past 24 hour(s))   CBC WITH AUTOMATED DIFF    Collection Time: 09/05/19 11:38 AM   Result Value Ref Range    WBC 5.1 4.3 - 11.1 K/uL    RBC 3.41 (L) 4.05 - 5.2 M/uL    HGB 10.5 (L) 11.7 - 15.4 g/dL    HCT 33.9 (L) 35.8 - 46.3 %    MCV 99.4 (H) 79.6 - 97.8 FL    MCH 30.8 26.1 - 32.9 PG    MCHC 31.0 (L) 31.4 - 35.0 g/dL    RDW 15.8 (H) 11.9 - 14.6 %    PLATELET 102 478 - 964 K/uL    MPV 10.6 9.4 - 12.3 FL    ABSOLUTE NRBC 0.00 0.0 - 0.2 K/uL    DF AUTOMATED      NEUTROPHILS 65 43 - 78 %    LYMPHOCYTES 16 13 - 44 %    MONOCYTES 10 4.0 - 12.0 %    EOSINOPHILS 6 0.5 - 7.8 %    BASOPHILS 2 0.0 - 2.0 %    IMMATURE GRANULOCYTES 0 0.0 - 5.0 %    ABS. NEUTROPHILS 3.4 1.7 - 8.2 K/UL    ABS. LYMPHOCYTES 0.8 0.5 - 4.6 K/UL    ABS. MONOCYTES 0.5 0.1 - 1.3 K/UL    ABS. EOSINOPHILS 0.3 0.0 - 0.8 K/UL    ABS.  BASOPHILS 0.1 0.0 - 0.2 K/UL ABS. IMM. GRANS. 0.0 0.0 - 0.5 K/UL   METABOLIC PANEL, COMPREHENSIVE    Collection Time: 09/05/19 11:38 AM   Result Value Ref Range    Sodium 140 136 - 145 mmol/L    Potassium 4.3 3.5 - 5.1 mmol/L    Chloride 106 98 - 107 mmol/L    CO2 29 21 - 32 mmol/L    Anion gap 5 (L) 7 - 16 mmol/L    Glucose 93 65 - 100 mg/dL    BUN 13 8 - 23 MG/DL    Creatinine 1.36 (H) 0.6 - 1.0 MG/DL    GFR est AA 47 (L) >60 ml/min/1.73m2    GFR est non-AA 39 (L) >60 ml/min/1.73m2    Calcium 9.3 8.3 - 10.4 MG/DL    Bilirubin, total 0.6 0.2 - 1.1 MG/DL    ALT (SGPT) 14 12 - 65 U/L    AST (SGOT) 20 15 - 37 U/L    Alk.  phosphatase 132 50 - 136 U/L    Protein, total 8.3 (H) 6.3 - 8.2 g/dL    Albumin 3.6 3.2 - 4.6 g/dL    Globulin 4.7 (H) 2.3 - 3.5 g/dL    A-G Ratio 0.8 (L) 1.2 - 3.5     BNP    Collection Time: 09/05/19 11:38 AM   Result Value Ref Range     (H) 0 pg/mL   LIPASE    Collection Time: 09/05/19 11:38 AM   Result Value Ref Range    Lipase 88 73 - 393 U/L   POC LACTIC ACID    Collection Time: 09/05/19 11:46 AM   Result Value Ref Range    Lactic Acid (POC) 0.82 0.5 - 1.9 mmol/L   EKG, 12 LEAD, INITIAL    Collection Time: 09/05/19 11:51 AM   Result Value Ref Range    Ventricular Rate 60 BPM    Atrial Rate 133 BPM    QRS Duration 172 ms    Q-T Interval 464 ms    QTC Calculation (Bezet) 464 ms    Calculated R Axis -88 degrees    Calculated T Axis 96 degrees    Diagnosis       Electronic ventricular pacemaker  Confirmed by Magalys Lunsford MD (), MICHEL YANG ([id number]) on 9/5/2019 12:03:00 PM     URINALYSIS W/ RFLX MICROSCOPIC    Collection Time: 09/05/19  2:13 PM   Result Value Ref Range    Color YELLOW      Appearance CLEAR      Specific gravity 1.012 1.001 - 1.023      pH (UA) 8.0 5.0 - 9.0      Protein NEGATIVE  NEG mg/dL    Glucose NEGATIVE  mg/dL    Ketone NEGATIVE  NEG mg/dL    Bilirubin NEGATIVE  NEG      Blood NEGATIVE  NEG      Urobilinogen 0.2 0.2 - 1.0 EU/dL    Nitrites NEGATIVE  NEG      Leukocyte Esterase NEGATIVE  NEG CT HEAD WO CONT (Final result)   Result time 09/05/19 13:13:19   Final result by Papito Cox MD (09/05/19 13:13:19)                Impression:    IMPRESSION: No acute findings            Narrative:    HEAD CT WITHOUT CONTRAST  9/5/2019     HISTORY:   confusion  ; vomiting and altered mental status. Confusion. TECHNIQUE: Noncontrast axial images were obtained through the brain.  All CT  scans at this facility used dose modulation, interactive reconstruction and/or  weight based dosing when appropriate to reduce radiation dose to as low as  reasonably achievable. COMPARISON: None    FINDINGS: There is no acute intracranial hemorrhage, significant mass effect or  CT evidence of acute large artery territorial infarction. Please note that a  hyperacute infarct or small vessel infarct may not be apparent on initial CT  imaging. Moderate diffuse cerebral volume loss is present. Areas of low-attenuation are  present in the supratentorial white matter. This pattern may be present with  chronic small vessel ischemic disease. Mild mucosal thickening is present in the  maxillary sinus on the left. There is no hydrocephalus , intra-axial mass or abnormal extra-axial fluid  collection. There are no displaced skull fractures. The mastoid air cells and  paranasal sinuses are clear where imaged.                    XR CHEST PORT (Final result)   Result time 09/05/19 12:14:59   Final result by Papito Cox MD (09/05/19 12:14:59)                Impression:    IMPRESSION: No consolidation. Narrative:    AP PORTABLE CHEST X-RAY    HISTORY: Cough. Confusion. COMPARISON: December 16, 2015    FINDINGS: Median sternotomy wires are present. EKG leads are present. A cardiac  pacemaker device is present. There is no consolidation or pleural effusions.  The cardiac silhouette is  prominent.

## 2019-11-12 NOTE — PROGRESS NOTES
Complex Case Management  Initial Assessment  Addendum to Connect Care Note  Utilize questions pertinent to patient Referral Source & Reason CCM assignment list     
Primary concerns per patient and/or pts family/caregiver Currently working with Home health services  patient had drug interaction approx. 2 weeks ago Recent Hospitalization(s)/ED Visits No  
Last ER visit was on 9/5/19 for abdominal pain and didnt result in hospitalization Current with Home Health? 
- Agency? Yes [facility] Social Needs: - Able to afford medications? - Financial assessment? 
- Access to care? Transportation? Yes  
Medicare and BCBS No issues with transport  family drives patient Nutritional Assessment - Appetite? - Obesity? 
- Failure to Thrive? - Bowels ? Denies any issues with appetite 54 165 pounds BMI 28.32 No  
Denies any issues at this time Cognitive Assessment 
- History of dementia 
- Health literacy No   
Granddaughter appears to be very knowledgeable about diagnoses Mobility/Activity Assessment - Bed/chair bound? - Make use of assistive devices? - Does patient still drive? No  
Yes No      
Plan/Interventions/Education - Follow up Appointments -  
- Referrals will f/u with patient next week  
patient has appointments with cardiology and PCP  
patient working with [facility] services and is beginning to improve after having a drug interaction at first of November This note will not be viewable in [address]

## 2019-11-19 NOTE — PROGRESS NOTES
· Outreached to f/u with patient and spoke with patient's granddaughter Maria Fernanda Fallon · Patient has had no further drug interactions · Patient has been having a good week · Continue to work with [facility] · Pacemaker check was WNL on 11/12/19 · Patient has f/u with PCP on 11/27/19 · Reviewed meds and denies issues PLAN: 
· Will f/u with patient/patient's granddaughter after Thanksgiving holiday This note will not be viewable in [address]

## 2019-12-03 NOTE — PROGRESS NOTES
· Outreached to f/u with patient and spoke with patient's granddaughter Velma Villalobos · Patient has been having a good week · Continuing to work with [facility] · Patient cancelled PCP apt on 11/27/19 and rescheduled on 12/10/19 and patient has cardiology apt that day, also · Reviewed meds and denies issues and patient has had no further drug interactions PLAN: 
· Will f/u with patient/patient's granddaughter again in 2 weeks This note will not be viewable in [address]

## 2020-01-01 ENCOUNTER — PATIENT OUTREACH (OUTPATIENT)
Dept: CASE MANAGEMENT | Age: 85
End: 2020-01-01

## 2020-01-17 NOTE — PROGRESS NOTES
· Outreached to f/u with patient and spoke with patient's granddaughter Eric Ayala · Patient had a fall in the shower a couple of days ago - patient is sore and bruised but doing ok · Continuing to work with [facility] · Patient requires reminders from family that she needs assistance with ADL's · Reviewed meds and denies issues and patient has had no further drug interactions PLAN: 
· Will f/u with patient/patient's granddaughter again in 2 weeks This note will not be viewable in [address]

## 2020-01-31 NOTE — PROGRESS NOTES
· Outreached to f/u with patient and spoke with patient's granddaughter Jorge Gregory · Patient had a f/u with cardiology on 1/29/2020 · Patient had f/u with wound care on 1/20/20 for venous stasis ulcer on her shin · Continuing to work with [facility] · Patient requires reminders from family that she needs assistance with ADL's · Reviewed meds and denies issues and patient has had no further drug interactions PLAN: 
Will f/u with patient/patient's granddaughter again in 2 weeks This note will not be viewable in [address]

## 2020-02-24 NOTE — PROGRESS NOTES
· Outreached to f/u with patient and spoke with patient's granddaughter Morris Nguyen · Patient had f/u with wound care on 2/18/20 · Patient has f/u with PCP on 3/10/20 · Continuing to work with [facility] · Patient requires reminders from family that she needs assistance with ADL's  
· Reviewed meds and denies issues and patient has had no further drug interactions PLAN: 
· Will f/u with patient/patient's granddaughter again in 2 weeks This note will not be viewable in [address]

## 2020-03-09 NOTE — PROGRESS NOTES
· Outreached to f/u with patient and spoke with patient's granddaughter Sabra Eisenmenger · Patient has f/u with PCP tomorrow 3/10/20 and will be asking the PCP about getting patient a shower chair that will fit in patient's shower · Continuing to work with [facility] · Patient requires reminders from family that she needs assistance with ADL's  
· Reviewed meds and denies issues and patient has had no further drug interactions PLAN: 
Will f/u with patient/patient's granddaughter again in 2 weeks This note will not be viewable in [address]

## (undated) DEVICE — KENDALL RADIOLUCENT FOAM MONITORING ELECTRODE RECTANGULAR SHAPE: Brand: KENDALL

## (undated) DEVICE — BLOCK BITE AD 60FR W/ VELC STRP ADDRESSES MOST PT AND

## (undated) DEVICE — CANNULA NSL ORAL AD FOR CAPNOFLEX CO2 O2 AIRLFE

## (undated) DEVICE — CONNECTOR TBNG OD5-7MM O2 END DISP

## (undated) DEVICE — FORCEPS BX L240CM JAW DIA2.8MM L CAP W/ NDL MIC MESH TOOTH